# Patient Record
Sex: FEMALE | Race: BLACK OR AFRICAN AMERICAN | NOT HISPANIC OR LATINO | URBAN - METROPOLITAN AREA
[De-identification: names, ages, dates, MRNs, and addresses within clinical notes are randomized per-mention and may not be internally consistent; named-entity substitution may affect disease eponyms.]

---

## 2017-05-15 ENCOUNTER — EMERGENCY (EMERGENCY)
Facility: HOSPITAL | Age: 42
LOS: 1 days | Discharge: HOME | End: 2017-05-15
Attending: EMERGENCY MEDICINE | Admitting: EMERGENCY MEDICINE
Payer: COMMERCIAL

## 2017-05-15 VITALS
DIASTOLIC BLOOD PRESSURE: 79 MMHG | OXYGEN SATURATION: 100 % | RESPIRATION RATE: 16 BRPM | TEMPERATURE: 98 F | SYSTOLIC BLOOD PRESSURE: 116 MMHG | HEART RATE: 68 BPM

## 2017-05-15 VITALS
RESPIRATION RATE: 19 BRPM | HEART RATE: 67 BPM | DIASTOLIC BLOOD PRESSURE: 70 MMHG | SYSTOLIC BLOOD PRESSURE: 110 MMHG | OXYGEN SATURATION: 100 % | TEMPERATURE: 98 F

## 2017-05-15 DIAGNOSIS — O00.1 TUBAL PREGNANCY: Chronic | ICD-10-CM

## 2017-05-15 DIAGNOSIS — K37 UNSPECIFIED APPENDICITIS: Chronic | ICD-10-CM

## 2017-05-15 LAB
ALBUMIN SERPL ELPH-MCNC: 3.9 G/DL — SIGNIFICANT CHANGE UP (ref 3.3–5)
ALP SERPL-CCNC: 67 U/L — SIGNIFICANT CHANGE UP (ref 40–120)
ALT FLD-CCNC: 20 U/L — SIGNIFICANT CHANGE UP (ref 4–33)
AST SERPL-CCNC: 30 U/L — SIGNIFICANT CHANGE UP (ref 4–32)
BASOPHILS # BLD AUTO: 0.02 K/UL — SIGNIFICANT CHANGE UP (ref 0–0.2)
BASOPHILS NFR BLD AUTO: 0.2 % — SIGNIFICANT CHANGE UP (ref 0–2)
BILIRUB SERPL-MCNC: 0.2 MG/DL — SIGNIFICANT CHANGE UP (ref 0.2–1.2)
BUN SERPL-MCNC: 23 MG/DL — SIGNIFICANT CHANGE UP (ref 7–23)
CALCIUM SERPL-MCNC: 10 MG/DL — SIGNIFICANT CHANGE UP (ref 8.4–10.5)
CHLORIDE SERPL-SCNC: 97 MMOL/L — LOW (ref 98–107)
CK MB BLD-MCNC: 0.9 — SIGNIFICANT CHANGE UP (ref 0–2.5)
CK MB BLD-MCNC: 1.67 NG/ML — SIGNIFICANT CHANGE UP (ref 1–4.7)
CK SERPL-CCNC: 176 U/L — HIGH (ref 25–170)
CO2 SERPL-SCNC: 23 MMOL/L — SIGNIFICANT CHANGE UP (ref 22–31)
CREAT SERPL-MCNC: 1.01 MG/DL — SIGNIFICANT CHANGE UP (ref 0.5–1.3)
EOSINOPHIL # BLD AUTO: 0.2 K/UL — SIGNIFICANT CHANGE UP (ref 0–0.5)
EOSINOPHIL NFR BLD AUTO: 1.9 % — SIGNIFICANT CHANGE UP (ref 0–6)
GLUCOSE SERPL-MCNC: 101 MG/DL — HIGH (ref 70–99)
HCG SERPL-ACNC: < 5 MIU/ML — SIGNIFICANT CHANGE UP
HCT VFR BLD CALC: 35.5 % — SIGNIFICANT CHANGE UP (ref 34.5–45)
HGB BLD-MCNC: 11.9 G/DL — SIGNIFICANT CHANGE UP (ref 11.5–15.5)
IMM GRANULOCYTES NFR BLD AUTO: 0.2 % — SIGNIFICANT CHANGE UP (ref 0–1.5)
LYMPHOCYTES # BLD AUTO: 2.17 K/UL — SIGNIFICANT CHANGE UP (ref 1–3.3)
LYMPHOCYTES # BLD AUTO: 21.1 % — SIGNIFICANT CHANGE UP (ref 13–44)
MCHC RBC-ENTMCNC: 26.2 PG — LOW (ref 27–34)
MCHC RBC-ENTMCNC: 33.5 % — SIGNIFICANT CHANGE UP (ref 32–36)
MCV RBC AUTO: 78.2 FL — LOW (ref 80–100)
MONOCYTES # BLD AUTO: 0.56 K/UL — SIGNIFICANT CHANGE UP (ref 0–0.9)
MONOCYTES NFR BLD AUTO: 5.4 % — SIGNIFICANT CHANGE UP (ref 2–14)
NEUTROPHILS # BLD AUTO: 7.32 K/UL — SIGNIFICANT CHANGE UP (ref 1.8–7.4)
NEUTROPHILS NFR BLD AUTO: 71.2 % — SIGNIFICANT CHANGE UP (ref 43–77)
PLATELET # BLD AUTO: 274 K/UL — SIGNIFICANT CHANGE UP (ref 150–400)
PMV BLD: 9.4 FL — SIGNIFICANT CHANGE UP (ref 7–13)
POTASSIUM SERPL-MCNC: 4 MMOL/L — SIGNIFICANT CHANGE UP (ref 3.5–5.3)
POTASSIUM SERPL-SCNC: 4 MMOL/L — SIGNIFICANT CHANGE UP (ref 3.5–5.3)
PROT SERPL-MCNC: 8.1 G/DL — SIGNIFICANT CHANGE UP (ref 6–8.3)
RBC # BLD: 4.54 M/UL — SIGNIFICANT CHANGE UP (ref 3.8–5.2)
RBC # FLD: 14.7 % — HIGH (ref 10.3–14.5)
SODIUM SERPL-SCNC: 136 MMOL/L — SIGNIFICANT CHANGE UP (ref 135–145)
TROPONIN T SERPL-MCNC: < 0.06 NG/ML — SIGNIFICANT CHANGE UP (ref 0–0.06)
WBC # BLD: 10.29 K/UL — SIGNIFICANT CHANGE UP (ref 3.8–10.5)
WBC # FLD AUTO: 10.29 K/UL — SIGNIFICANT CHANGE UP (ref 3.8–10.5)

## 2017-05-15 PROCEDURE — 70450 CT HEAD/BRAIN W/O DYE: CPT | Mod: 26

## 2017-05-15 PROCEDURE — 99285 EMERGENCY DEPT VISIT HI MDM: CPT | Mod: 25

## 2017-05-15 PROCEDURE — 71010: CPT | Mod: 26

## 2017-05-15 PROCEDURE — 93010 ELECTROCARDIOGRAM REPORT: CPT

## 2017-05-15 RX ORDER — ASPIRIN/CALCIUM CARB/MAGNESIUM 324 MG
325 TABLET ORAL ONCE
Qty: 0 | Refills: 0 | Status: COMPLETED | OUTPATIENT
Start: 2017-05-15 | End: 2017-05-15

## 2017-05-15 RX ADMIN — Medication 325 MILLIGRAM(S): at 01:24

## 2017-05-15 NOTE — ED ADULT NURSE NOTE - FAMILY HISTORY
Father  Still living? No  Family history of coronary artery disease, Age at diagnosis: Age Unknown     Mother  Still living? Unknown  Family history of coronary artery disease, Age at diagnosis: Age Unknown

## 2017-05-15 NOTE — ED PROVIDER NOTE - PROGRESS NOTE DETAILS
Hector AARON- HEART score of 2, had neg stress test last year, pt advised to rest, stay hydrated and f/u with her pmd, return promptly in c/o worsening symptoms

## 2017-05-15 NOTE — ED ADULT TRIAGE NOTE - CHIEF COMPLAINT QUOTE
pt c/o chest discomfort and headache for 2 days. pt states she feels like she is going to faint. pt denies any nausea or vomiting and states no abdominal pain.

## 2017-05-15 NOTE — ED PROVIDER NOTE - OBJECTIVE STATEMENT
42 y/o F with h/o htn but not on meds for yrs now as it was stable p/w headache and malaise today and went to sleep and then woke up drenched in sweats with sob and chest tightness. Pt never had symptoms like this before, chest tightness was substernal and has resolved, pt is non smoker, no fhx of cad, no weakness, numbness, sick contact, trauma or recent travel

## 2017-05-15 NOTE — ED ADULT NURSE NOTE - OBJECTIVE STATEMENT
pt. came in c/o SOB and on and off chest discomfort for 1 day, feeling very weak and fainting. denies any LOC. pt. states she had intermittent h/a 2 days ago, denies any dizziness. pt. c/o nausea, no vomiting. denies any fever, dysuria, recent travel outside the country. labs were drawn. g20 saline lock on rt ac inserted with no ss of infiltration. respiration appears symmetrical and unlabored. awaits further eval.

## 2017-10-31 ENCOUNTER — EMERGENCY (EMERGENCY)
Facility: HOSPITAL | Age: 42
LOS: 1 days | Discharge: HOME | End: 2017-10-31
Attending: EMERGENCY MEDICINE | Admitting: EMERGENCY MEDICINE
Payer: COMMERCIAL

## 2017-10-31 VITALS
DIASTOLIC BLOOD PRESSURE: 86 MMHG | TEMPERATURE: 98 F | SYSTOLIC BLOOD PRESSURE: 129 MMHG | RESPIRATION RATE: 18 BRPM | HEART RATE: 67 BPM | OXYGEN SATURATION: 99 %

## 2017-10-31 VITALS
HEART RATE: 66 BPM | RESPIRATION RATE: 17 BRPM | DIASTOLIC BLOOD PRESSURE: 76 MMHG | SYSTOLIC BLOOD PRESSURE: 123 MMHG | OXYGEN SATURATION: 99 %

## 2017-10-31 DIAGNOSIS — O00.1 TUBAL PREGNANCY: Chronic | ICD-10-CM

## 2017-10-31 DIAGNOSIS — K37 UNSPECIFIED APPENDICITIS: Chronic | ICD-10-CM

## 2017-10-31 PROCEDURE — 99283 EMERGENCY DEPT VISIT LOW MDM: CPT

## 2017-10-31 RX ORDER — IBUPROFEN 200 MG
1 TABLET ORAL
Qty: 20 | Refills: 0 | OUTPATIENT
Start: 2017-10-31 | End: 2017-11-05

## 2017-10-31 RX ORDER — ACETAMINOPHEN 500 MG
650 TABLET ORAL ONCE
Qty: 0 | Refills: 0 | Status: DISCONTINUED | OUTPATIENT
Start: 2017-10-31 | End: 2017-11-11

## 2017-10-31 RX ORDER — DIAZEPAM 5 MG
1 TABLET ORAL
Qty: 9 | Refills: 0 | OUTPATIENT
Start: 2017-10-31 | End: 2017-11-03

## 2017-10-31 RX ORDER — IBUPROFEN 200 MG
600 TABLET ORAL ONCE
Qty: 0 | Refills: 0 | Status: COMPLETED | OUTPATIENT
Start: 2017-10-31 | End: 2017-10-31

## 2017-10-31 RX ADMIN — Medication 600 MILLIGRAM(S): at 11:34

## 2017-10-31 RX ADMIN — Medication 600 MILLIGRAM(S): at 12:23

## 2017-10-31 NOTE — ED ADULT TRIAGE NOTE - CHIEF COMPLAINT QUOTE
pt coming with sebastien. lower back pain since yest. pain rad. down the legs, denies truama, pt took Motrin last night w/o relief.

## 2017-10-31 NOTE — ED PROVIDER NOTE - OBJECTIVE STATEMENT
41 y/o F w/ PMHx of HTN (somewhat non compliant w/ meds), presents to the ED c/o lower back pain x2 days. Pt states she woke up w/ gradual onset of right lower back pain, worse w/ movement. Pain is lower back and radiates to posterior right thigh. Denies urinary/bowel dysfunction, fever, chills, abd pain, weakness or paresthesias. Pt denies any prior similar episodes. Pt does feel somewhat improved w/ Ibuprofen.

## 2017-10-31 NOTE — ED PROVIDER NOTE - CHPI ED SYMPTOMS NEG
no numbness/no fever, no chills, no weakness/no bladder dysfunction/no bowel dysfunction/no tingling

## 2017-10-31 NOTE — ED PROVIDER NOTE - MUSCULOSKELETAL MINIMAL EXAM
paralumbar and right SI point tenderness, no midline spinal tenderness, motor and sensory intact, pulses 2+ intact, positive straight leg raise

## 2017-10-31 NOTE — ED PROVIDER NOTE - CARE PLAN
Principal Discharge DX:	Sciatica  Instructions for follow-up, activity and diet:	Limit further injury, over exertion, and rest affected area. Take motrin 600mg every 6hours as needed for mild to moderate pain. Take Valium 5mg one tab every 6-8 hours as needed for severe pain. NEVER COMBINE WITH ALCOHOL. NEVER DRINK OR DRIVE ON VALIUM IT WILL CAUSE ACCIDENTS. Please continue all home medications as directed. See your regular doctor within 72 hours for follow-up care. Consider seeing a spine doctor, see attached list. Return to ER for new or worsening symptoms.

## 2017-10-31 NOTE — ED PROVIDER NOTE - MEDICAL DECISION MAKING DETAILS
A/P 43 y/o F w/ back pain w/ radiation, consistent w/ MSK pain/sciatica. Neuro nonfocal. Advise Pain management and follow up w/ PMD.

## 2017-10-31 NOTE — ED PROVIDER NOTE - PROGRESS NOTE DETAILS
Cameron PGY-3: pt ambulatory, feeling significantly better, will dc with pain meds and ortho/spine list.

## 2018-04-23 ENCOUNTER — EMERGENCY (EMERGENCY)
Facility: HOSPITAL | Age: 43
LOS: 1 days | Discharge: LEFT BEFORE DISCHARGE | End: 2018-04-23
Admitting: EMERGENCY MEDICINE

## 2018-04-23 VITALS
DIASTOLIC BLOOD PRESSURE: 79 MMHG | RESPIRATION RATE: 16 BRPM | HEART RATE: 70 BPM | SYSTOLIC BLOOD PRESSURE: 137 MMHG | OXYGEN SATURATION: 100 % | TEMPERATURE: 98 F

## 2018-04-23 DIAGNOSIS — K37 UNSPECIFIED APPENDICITIS: Chronic | ICD-10-CM

## 2018-04-23 DIAGNOSIS — O00.1 TUBAL PREGNANCY: Chronic | ICD-10-CM

## 2018-04-23 NOTE — ED ADULT TRIAGE NOTE - CHIEF COMPLAINT QUOTE
c.o left sided chest pain starting 15 minutes ago. took 162 aspirin with no relief. denies nausea/vomiting or sob. pmh htn

## 2018-05-30 ENCOUNTER — RESULT REVIEW (OUTPATIENT)
Age: 43
End: 2018-05-30

## 2018-06-15 NOTE — ED ADULT NURSE NOTE - EXTENSIONS OF SELF_ADULT
----- Message from Zhanna Fletcher [facility]. Please call back at [phone].  
Left mess rx approved  
None

## 2018-10-03 NOTE — ED PROVIDER NOTE - EKG #1 DATE/TIME
Syncope/Near Syncope/Dizziness


Time Seen by Provider: 10/03/18 10:41


Chief Complaint (Nursing): Dizziness/Lightheaded


Chief Complaint (Provider): Dizziness/Lightheaded


History Per: Patient, Family (grand daughter),  (Selam, patient's 

grand daughter)


History/Exam Limitations: no limitations


Additional Complaint(s): 





Patient is a 65 y/o female with history of hypertension and 

hypercholesterolemia, who presents to the ED complaining of blurry vision with 

associated dizziness and nausea. Patient reports that the dizziness and vision 

change episodes are intermittent and occur for 30 minute intervals. She also 

states that she feels like the room is spinning and she feels a little 

unbalanced and feels like she is about to faint. Patient also reports current 

photosensitivity and still has some blurry vision on arrival. She denies chest 

pain, shortness of breath, fever, and vomiting. Additionally, patient is 

concerned about some bruising on her arms.





Patient's grand daughter, Selam, who is at her bedside has been authorized by 

patient to function as a .





PMD: Dr. Kylee Rivero. At [facility]





Past Medical History


Reviewed: Historical Data, Nursing Documentation, Vital Signs


Vital Signs: 





                                Last Vital Signs











Temp  97 F L  10/03/18 09:51


 


Pulse  64   10/03/18 09:51


 


Resp  20   10/03/18 09:51


 


BP  145/93 H  10/03/18 09:51


 


Pulse Ox  97   10/03/18 09:51














- Medical History


PMH: Anxiety, Colonic Polyps, Depression, Gastritis, HTN, Hypercholesterolemia, 

Osteoporosis


   Denies: Malignancy, Chronic Kidney Disease





- Surgical History


Surgical History: Endoscopy, 





- Family History


Family History: States: Unknown Family Hx





- Immunization History


Hx Tetanus Toxoid Vaccination: No


Hx Influenza Vaccination: Yes


Hx Pneumococcal Vaccination: No





- Home Medications


Home Medications: 


                                Ambulatory Orders











 Medication  Instructions  Recorded


 


Amlodipine Besylate [Amlodipine] 5 mg PO DAILY 14


 


Atorvastatin Calcium [Lipitor] 10 mg PO DAILY 14


 


Raloxifene HCl [Evista] 60 mg PO DAILY 02/15/16


 


Ibuprofen [Motrin] 600 mg PO TID #20 tab 16


 


Methocarbamol [Robaxin] 500 mg PO BID #8 tab 16


 


Naproxen 500 mg PO BID #30 tab 11/16/17


 


Dicyclomine [Bentyl] 20 mg PO QID PRN #20 tab 18


 


Saccharomyces Boulardi [Florastor] 500 mg PO BID #28 cap 18


 


Meclizine [Meclizine*] 25 mg PO Q6 PRN #6 tab 10/03/18














- Allergies


Allergies/Adverse Reactions: 


                                    Allergies











Allergy/AdvReac Type Severity Reaction Status Date / Time


 


No Known Allergies Allergy   Verified 18 10:27














Review of Systems


ROS Statement: Except As Marked, All Systems Reviewed And Found Negative


Constitutional: Negative for: Fever


Eyes: Positive for: Vision Change (blurry vision)


Cardiovascular: Negative for: Chest Pain


Respiratory: Negative for: Shortness of Breath


Gastrointestinal: Positive for: Nausea.  Negative for: Vomiting


Skin: Positive for: Bruising


Neurological: Negative for: Dizziness





Physical Exam





- Reviewed


Nursing Documentation Reviewed: Yes


Vital Signs Reviewed: Yes





- Physical Exam


Appears: Positive for: Well, Non-toxic, No Acute Distress


Head Exam: Positive for: ATRAUMATIC, NORMAL INSPECTION, NORMOCEPHALIC


Skin: Positive for: Normal Color, Warm, DRY


Eye Exam: Positive for: EOMI, Normal appearance, PERRL


Neck: Positive for: Normal, Painless ROM


Cardiovascular/Chest: Positive for: Regular Rate, Rhythm.  Negative for: Murmur


Respiratory: Positive for: Normal Breath Sounds.  Negative for: Respiratory 

Distress


Gastrointestinal/Abdominal: Positive for: Normal Exam, Soft.  Negative for: 

Tenderness


Back: Positive for: Normal Inspection.  Negative for: L CVA Tenderness, R CVA 

Tenderness


Extremity: Positive for: Normal ROM.  Negative for: Pedal Edema, Deformity


Neurologic/Psych: Positive for: Alert, CNs II-XII (intact), Oriented, 

Mood/Affect (flat), Cerebellar Tests (intact), Gait (steady).  Negative for: 

Motor/Sensory Deficits, Aphasia, Facial Droop





- Laboratory Results


Result Diagrams: 


                                 10/03/18 11:02





                                 10/03/18 11:02





- ECG


O2 Sat by Pulse Oximetry: 97 (RA)


Pulse Ox Interpretation: Normal





Medical Decision Making


Medical Decision Making: 





Time: 10:50


Initial Impression: Dizziness, r/o cardiac etiology, vertigo, cranial 

abnormality


Initial Plan: 


--CT head


--CMP


--Troponin


--CBC w/ diff


--Antivert 25 mg








Time: 11:28





FINDINGS:





HEMORRHAGE:


No intracranial hemorrhage. 





BRAIN:


No mass effect or edema.  There is mild generalized cerebral atrophy and mild 

prominence of the frontal extra axial spaces and cerebral sulci.  No 

particularly prominent microvascular ischemic changes are noted..





VENTRICLES:


Unremarkable. No hydrocephalus. 





CALVARIUM:


Unremarkable.





PARANASAL SINUSES:


Unremarkable as visualized. No significant inflammatory changes.





MASTOID AIR CELLS:


Unremarkable as visualized. No inflammatory changes.





OTHER FINDINGS:


None.





IMPRESSION:


Mild generalized cerebral atrophy.  Otherwise unremarkable exam





No hemorrhage or mass effect.








13:55


Patient was reevaluated and does not have any blurred vision and will most 

likely be discharged. Told to follow up with Dr. Rivero. 


------------------------------------------------------------------------------

-------------------


Scribe Attestation:


Documented by Zana Molina acting as a scribe for Neil Perez MD





Provider Scribe Attestation:


All medical record entries made by the Scribe were at my direction and 

personally dictated by me. I have reviewed the chart and agree that the record 

accurately reflects my personal performance of the history, physical exam, 

medical decision making, and the department course for this patient. I have also

 personally directed, reviewed, and agree with the discharge instructions and 

disposition.





Disposition





- Clinical Impression


Clinical Impression: 


 Dizziness








- Disposition


Condition: IMPROVED


Additional Instructions: 


follow up with Dr Rivero in 1-2 days for reevaluation- possible referral to 

neurologist


return to the ED with any worsening or concerning symptoms


Prescriptions: 


Meclizine [Meclizine*] 25 mg PO Q6 PRN #6 tab


 PRN Reason: Dizziness


Instructions:  Vertigo (a Type of Dizziness)


Forms:  [place] (English)


Print Language: Kittitian 15-May-2017 01:00

## 2019-01-19 ENCOUNTER — EMERGENCY (EMERGENCY)
Facility: HOSPITAL | Age: 44
LOS: 1 days | Discharge: HOME | End: 2019-01-19
Attending: EMERGENCY MEDICINE | Admitting: EMERGENCY MEDICINE
Payer: COMMERCIAL

## 2019-01-19 VITALS
HEART RATE: 81 BPM | DIASTOLIC BLOOD PRESSURE: 83 MMHG | OXYGEN SATURATION: 98 % | SYSTOLIC BLOOD PRESSURE: 144 MMHG | TEMPERATURE: 98 F | RESPIRATION RATE: 18 BRPM

## 2019-01-19 DIAGNOSIS — K37 UNSPECIFIED APPENDICITIS: Chronic | ICD-10-CM

## 2019-01-19 DIAGNOSIS — O00.1 TUBAL PREGNANCY: Chronic | ICD-10-CM

## 2019-01-19 PROBLEM — I10 ESSENTIAL (PRIMARY) HYPERTENSION: Chronic | Status: ACTIVE | Noted: 2017-10-31

## 2019-01-19 LAB
ALBUMIN SERPL ELPH-MCNC: 4.2 G/DL — SIGNIFICANT CHANGE UP (ref 3.3–5)
ALP SERPL-CCNC: 74 U/L — SIGNIFICANT CHANGE UP (ref 40–120)
ALT FLD-CCNC: 17 U/L — SIGNIFICANT CHANGE UP (ref 4–33)
ANION GAP SERPL CALC-SCNC: 12 MMO/L — SIGNIFICANT CHANGE UP (ref 7–14)
AST SERPL-CCNC: 33 U/L — HIGH (ref 4–32)
BASOPHILS # BLD AUTO: 0.03 K/UL — SIGNIFICANT CHANGE UP (ref 0–0.2)
BASOPHILS NFR BLD AUTO: 0.5 % — SIGNIFICANT CHANGE UP (ref 0–2)
BILIRUB SERPL-MCNC: 0.2 MG/DL — SIGNIFICANT CHANGE UP (ref 0.2–1.2)
BUN SERPL-MCNC: 10 MG/DL — SIGNIFICANT CHANGE UP (ref 7–23)
CALCIUM SERPL-MCNC: 9.2 MG/DL — SIGNIFICANT CHANGE UP (ref 8.4–10.5)
CHLORIDE SERPL-SCNC: 102 MMOL/L — SIGNIFICANT CHANGE UP (ref 98–107)
CO2 SERPL-SCNC: 24 MMOL/L — SIGNIFICANT CHANGE UP (ref 22–31)
CREAT SERPL-MCNC: 0.72 MG/DL — SIGNIFICANT CHANGE UP (ref 0.5–1.3)
EOSINOPHIL # BLD AUTO: 0.4 K/UL — SIGNIFICANT CHANGE UP (ref 0–0.5)
EOSINOPHIL NFR BLD AUTO: 7 % — HIGH (ref 0–6)
GLUCOSE SERPL-MCNC: 110 MG/DL — HIGH (ref 70–99)
HCT VFR BLD CALC: 31.2 % — LOW (ref 34.5–45)
HGB BLD-MCNC: 9.9 G/DL — LOW (ref 11.5–15.5)
IMM GRANULOCYTES NFR BLD AUTO: 0.2 % — SIGNIFICANT CHANGE UP (ref 0–1.5)
LYMPHOCYTES # BLD AUTO: 1.74 K/UL — SIGNIFICANT CHANGE UP (ref 1–3.3)
LYMPHOCYTES # BLD AUTO: 30.3 % — SIGNIFICANT CHANGE UP (ref 13–44)
MCHC RBC-ENTMCNC: 22.5 PG — LOW (ref 27–34)
MCHC RBC-ENTMCNC: 31.7 % — LOW (ref 32–36)
MCV RBC AUTO: 70.9 FL — LOW (ref 80–100)
MONOCYTES # BLD AUTO: 0.59 K/UL — SIGNIFICANT CHANGE UP (ref 0–0.9)
MONOCYTES NFR BLD AUTO: 10.3 % — SIGNIFICANT CHANGE UP (ref 2–14)
NEUTROPHILS # BLD AUTO: 2.97 K/UL — SIGNIFICANT CHANGE UP (ref 1.8–7.4)
NEUTROPHILS NFR BLD AUTO: 51.7 % — SIGNIFICANT CHANGE UP (ref 43–77)
NRBC # FLD: 0 K/UL — LOW (ref 25–125)
PLATELET # BLD AUTO: 297 K/UL — SIGNIFICANT CHANGE UP (ref 150–400)
PMV BLD: 9.1 FL — SIGNIFICANT CHANGE UP (ref 7–13)
POTASSIUM SERPL-MCNC: 4.2 MMOL/L — SIGNIFICANT CHANGE UP (ref 3.5–5.3)
POTASSIUM SERPL-SCNC: 4.2 MMOL/L — SIGNIFICANT CHANGE UP (ref 3.5–5.3)
PROT SERPL-MCNC: 7.6 G/DL — SIGNIFICANT CHANGE UP (ref 6–8.3)
RBC # BLD: 4.4 M/UL — SIGNIFICANT CHANGE UP (ref 3.8–5.2)
RBC # FLD: 17.8 % — HIGH (ref 10.3–14.5)
SODIUM SERPL-SCNC: 138 MMOL/L — SIGNIFICANT CHANGE UP (ref 135–145)
WBC # BLD: 5.74 K/UL — SIGNIFICANT CHANGE UP (ref 3.8–10.5)
WBC # FLD AUTO: 5.74 K/UL — SIGNIFICANT CHANGE UP (ref 3.8–10.5)

## 2019-01-19 PROCEDURE — 71046 X-RAY EXAM CHEST 2 VIEWS: CPT | Mod: 26

## 2019-01-19 PROCEDURE — 99284 EMERGENCY DEPT VISIT MOD MDM: CPT | Mod: 25

## 2019-01-19 RX ORDER — SODIUM CHLORIDE 9 MG/ML
1000 INJECTION INTRAMUSCULAR; INTRAVENOUS; SUBCUTANEOUS ONCE
Qty: 0 | Refills: 0 | Status: COMPLETED | OUTPATIENT
Start: 2019-01-19 | End: 2019-01-19

## 2019-01-19 RX ORDER — ACETAMINOPHEN 500 MG
650 TABLET ORAL ONCE
Qty: 0 | Refills: 0 | Status: COMPLETED | OUTPATIENT
Start: 2019-01-19 | End: 2019-01-19

## 2019-01-19 RX ADMIN — SODIUM CHLORIDE 1000 MILLILITER(S): 9 INJECTION INTRAMUSCULAR; INTRAVENOUS; SUBCUTANEOUS at 08:31

## 2019-01-19 RX ADMIN — Medication 650 MILLIGRAM(S): at 08:30

## 2019-01-19 NOTE — ED ADULT TRIAGE NOTE - CHIEF COMPLAINT QUOTE
Pt walk in c/o chest tightness for 2 days, constant, non radiating with productive cough, yellow sputum, SOB, Body aches Throat pain, Denies Palpitation N V Fever Denies PMHx

## 2019-01-19 NOTE — ED PROVIDER NOTE - PHYSICAL EXAMINATION
Gen: AAOx3, non-toxic  Head: NCAT  HEENT: EOMI, normal conjunctiva, oral mucosa moist, -Erythema or exudates  Lung: CTAB, no respiratory distress, no wheezes/rhonchi/rales B/L, speaking in full sentences  CV: RRR, no murmurs, rubs or gallops  Abd: soft, NTND, no guarding, no CVA tenderness  MSK: no visible deformities  Psych: normal affect.   ~Fortino Freedman M.D. Resident

## 2019-01-19 NOTE — ED PROVIDER NOTE - NS ED ROS FT
GENERAL: No fever or chills, EYES: no change in vision, HEENT: no trouble swallowing or speaking, CARDIAC: +chest tightness, PULMONARY: +cough or SOB,   GI: no abdominal pain, no nausea, no vomiting, no diarrhea or constipation, : No changes in urination, SKIN: no rashes, NEURO: no headache,  MSK: No joint pain ~Fortino Freedman M.D. Resident

## 2019-01-19 NOTE — ED PROVIDER NOTE - MEDICAL DECISION MAKING DETAILS
43yF with h/o HTN presents with intermittent productive cough (yellow/white), subjective fevers, chills of 6 duration with concern for PNA vs URI  Plan: Labs,CXR, poct HCG 43yF with h/o HTN presents with intermittent productive cough (yellow/white), subjective fevers, chills of 6 duration with concern for PNA vs URI  Plan: Labs,CXR, poct HCG    cabot: 43F with PMH of HTN, here with 6d of fevers, chills, body aches, cough with white sputum.  She has been taking her daughter's amox.  No recent travel or sick contacts.  On exam, HDS, NAD, MMM, eyes clear, lungs CTAB, heart sounds normal, abd soft, NT, ND, no CVAT, LEs without edema, wwp, skin normal temperature and color, neuro: alert and oriented, no focal deficits.  Likely viral URI.  Will check CXR, hydrate, tx sx.

## 2019-01-19 NOTE — ED PROVIDER NOTE - OBJECTIVE STATEMENT
43yF with h/o HTN presents with intermittent productive cough (yellow/white), subjective fevers, chills of 6 duration. Patient states that she took azithromycin (500mg x 3days) which was originally prescribed for her daughter without any relief. Patient endorse chest tightness, lightheadedness, and occasional sob 2/2 to cough but denies changes in vision, n/v, abd pain or rash.

## 2019-01-19 NOTE — ED PROVIDER NOTE - ATTENDING CONTRIBUTION TO CARE
I, Jennifer Cabot, MD, have performed a history and physical exam of the patient and discussed their management with the resident. I reviewed the resident's note and agree with the documented findings and plan of care. My medical decision making and observations are found above.    veronicagwen: 43F with PMH of HTN, here with 6d of fevers, chills, body aches, cough with white sputum.  She has been taking her daughter's amox.  No recent travel or sick contacts.  On exam, HDS, NAD, MMM, eyes clear, lungs CTAB, heart sounds normal, abd soft, NT, ND, no CVAT, LEs without edema, wwp, skin normal temperature and color, neuro: alert and oriented, no focal deficits.  Likely viral URI.  Will check CXR, hydrate, tx sx.

## 2019-01-19 NOTE — ED PROVIDER NOTE - NSFOLLOWUPINSTRUCTIONS_ED_ALL_ED_FT
Please follow up with your primary care physician in 24-48 hours  You have been prescribed Tessalon Perles  Please take as prescribed   Please return if any of the following: Fever, chills, body aches, nausea, vomiting, chest pain, shortness of breath or any major concern

## 2019-01-19 NOTE — ED ADULT NURSE NOTE - OBJECTIVE STATEMENT
Pt A+OX3 c/o chest tightness, intermittent SOB, cough, and lightheadedness x3 days.  Says she took her daughters antibiotic with no relief.  Labs obtained and sent as ordered.  #20g SL right hand placed.  IVF begun.  Tylenol given as ordered

## 2019-05-17 ENCOUNTER — NON-APPOINTMENT (OUTPATIENT)
Age: 44
End: 2019-05-17

## 2019-05-17 ENCOUNTER — APPOINTMENT (OUTPATIENT)
Dept: CARDIOLOGY | Facility: CLINIC | Age: 44
End: 2019-05-17
Payer: COMMERCIAL

## 2019-05-17 VITALS
DIASTOLIC BLOOD PRESSURE: 87 MMHG | HEART RATE: 57 BPM | OXYGEN SATURATION: 100 % | WEIGHT: 213 LBS | HEIGHT: 64 IN | BODY MASS INDEX: 36.37 KG/M2 | SYSTOLIC BLOOD PRESSURE: 131 MMHG

## 2019-05-17 DIAGNOSIS — R00.2 PALPITATIONS: ICD-10-CM

## 2019-05-17 DIAGNOSIS — Z82.49 FAMILY HISTORY OF ISCHEMIC HEART DISEASE AND OTHER DISEASES OF THE CIRCULATORY SYSTEM: ICD-10-CM

## 2019-05-17 DIAGNOSIS — Z86.79 PERSONAL HISTORY OF OTHER DISEASES OF THE CIRCULATORY SYSTEM: ICD-10-CM

## 2019-05-17 DIAGNOSIS — R73.03 PREDIABETES.: ICD-10-CM

## 2019-05-17 DIAGNOSIS — Z87.59 PERSONAL HISTORY OF OTHER COMPLICATIONS OF PREGNANCY, CHILDBIRTH AND THE PUERPERIUM: ICD-10-CM

## 2019-05-17 PROCEDURE — 99204 OFFICE O/P NEW MOD 45 MIN: CPT

## 2019-05-17 PROCEDURE — 93000 ELECTROCARDIOGRAM COMPLETE: CPT

## 2019-05-21 PROBLEM — Z86.79 HISTORY OF HYPERTENSION: Status: RESOLVED | Noted: 2019-05-21 | Resolved: 2019-05-21

## 2019-05-21 PROBLEM — Z82.49 FAMILY HISTORY OF ACUTE MYOCARDIAL INFARCTION: Status: ACTIVE | Noted: 2019-05-21

## 2019-05-21 PROBLEM — Z87.59 HISTORY OF ECTOPIC PREGNANCY: Status: RESOLVED | Noted: 2019-05-21 | Resolved: 2019-05-21

## 2019-05-21 PROBLEM — R00.2 PALPITATIONS: Status: ACTIVE | Noted: 2019-05-21

## 2019-05-21 PROBLEM — Z82.49 FAMILY HISTORY OF CARDIOMYOPATHY: Status: ACTIVE | Noted: 2019-05-21

## 2019-05-21 PROBLEM — R73.03 BORDERLINE DIABETES: Status: ACTIVE | Noted: 2019-05-21

## 2019-05-21 PROBLEM — R73.03 BORDERLINE DIABETES MELLITUS: Status: RESOLVED | Noted: 2019-05-21 | Resolved: 2019-05-21

## 2019-05-21 NOTE — HISTORY OF PRESENT ILLNESS
[FreeTextEntry1] : Ms. Doss presents to the office today for an initial cardiovascular evaluation.\par \par Ms. Doss is a 43 year old female with a medical history significant for hypertension and borderline diabetes mellitus.  In addition, she has previously undergone three  sections, an appendectomy, and surgery to evacuate an ectopic pregnancy.\par \par Ms. Doss reports a history of worsening dyspnea over the last year or so.  She finds that she becomes short of breath with activities such as walking more than two blocks or climbing a single flight of stairs.  She has not had any dyspnea at rest.  Ms. Doss denies having any chest pain either at rest or with exertion.  Ms. Doss reports that she ordinarily experiences relatively frequent palpitations but denies having any presyncope or syncope.  In addition, there has been no history of orthopnea, paroxysmal nocturnal dyspnea, or edema.

## 2019-05-21 NOTE — DISCUSSION/SUMMARY
[FreeTextEntry1] : In summary, Ms. Doss is a 43 year old female with a medical history significant for hypertension and borderline diabetes mellitus.  In addition, she has previously undergone three  sections, an appendectomy, and surgery to evacuate an ectopic pregnancy.  Ms. Doss now presents with a history of exertional dyspnea that has been progressive over the past year or so.  I suggested to her that we proceed with an exercise stress echocardiogram for further evaluation.  Ms. Doss is in agreement with this plan and will schedule the stress echocardiogram.  She will follow up with me once I obtain the results of the study.

## 2019-06-04 ENCOUNTER — APPOINTMENT (OUTPATIENT)
Dept: CV DIAGNOSITCS | Facility: HOSPITAL | Age: 44
End: 2019-06-04

## 2019-08-06 ENCOUNTER — APPOINTMENT (OUTPATIENT)
Dept: CV DIAGNOSITCS | Facility: HOSPITAL | Age: 44
End: 2019-08-06
Payer: COMMERCIAL

## 2019-08-06 ENCOUNTER — OUTPATIENT (OUTPATIENT)
Dept: OUTPATIENT SERVICES | Facility: HOSPITAL | Age: 44
LOS: 1 days | End: 2019-08-06

## 2019-08-06 DIAGNOSIS — K37 UNSPECIFIED APPENDICITIS: Chronic | ICD-10-CM

## 2019-08-06 DIAGNOSIS — O00.1 TUBAL PREGNANCY: Chronic | ICD-10-CM

## 2019-08-06 DIAGNOSIS — R06.00 DYSPNEA, UNSPECIFIED: ICD-10-CM

## 2019-08-06 LAB — HCG UR QL: NEGATIVE — SIGNIFICANT CHANGE UP

## 2019-08-06 PROCEDURE — 93325 DOPPLER ECHO COLOR FLOW MAPG: CPT | Mod: 26,GC

## 2019-08-06 PROCEDURE — 93320 DOPPLER ECHO COMPLETE: CPT | Mod: 26,GC

## 2019-08-06 PROCEDURE — 93350 STRESS TTE ONLY: CPT | Mod: 26

## 2019-09-01 ENCOUNTER — EMERGENCY (EMERGENCY)
Facility: HOSPITAL | Age: 44
LOS: 1 days | Discharge: HOME | End: 2019-09-01
Attending: EMERGENCY MEDICINE | Admitting: EMERGENCY MEDICINE
Payer: COMMERCIAL

## 2019-09-01 VITALS
DIASTOLIC BLOOD PRESSURE: 77 MMHG | HEART RATE: 70 BPM | TEMPERATURE: 98 F | RESPIRATION RATE: 18 BRPM | SYSTOLIC BLOOD PRESSURE: 131 MMHG | OXYGEN SATURATION: 100 %

## 2019-09-01 VITALS
RESPIRATION RATE: 18 BRPM | OXYGEN SATURATION: 96 % | HEART RATE: 69 BPM | DIASTOLIC BLOOD PRESSURE: 56 MMHG | SYSTOLIC BLOOD PRESSURE: 126 MMHG | TEMPERATURE: 98 F

## 2019-09-01 DIAGNOSIS — O00.1 TUBAL PREGNANCY: Chronic | ICD-10-CM

## 2019-09-01 DIAGNOSIS — K37 UNSPECIFIED APPENDICITIS: Chronic | ICD-10-CM

## 2019-09-01 LAB
ALBUMIN SERPL ELPH-MCNC: 4.1 G/DL — SIGNIFICANT CHANGE UP (ref 3.3–5)
ALP SERPL-CCNC: 76 U/L — SIGNIFICANT CHANGE UP (ref 40–120)
ALT FLD-CCNC: 12 U/L — SIGNIFICANT CHANGE UP (ref 4–33)
ANION GAP SERPL CALC-SCNC: 9 MMO/L — SIGNIFICANT CHANGE UP (ref 7–14)
ANISOCYTOSIS BLD QL: SIGNIFICANT CHANGE UP
APPEARANCE UR: SIGNIFICANT CHANGE UP
AST SERPL-CCNC: 16 U/L — SIGNIFICANT CHANGE UP (ref 4–32)
BACTERIA # UR AUTO: HIGH
BASOPHILS # BLD AUTO: 0.03 K/UL — SIGNIFICANT CHANGE UP (ref 0–0.2)
BASOPHILS NFR BLD AUTO: 0.3 % — SIGNIFICANT CHANGE UP (ref 0–2)
BASOPHILS NFR SPEC: 0 % — SIGNIFICANT CHANGE UP (ref 0–2)
BILIRUB SERPL-MCNC: 0.3 MG/DL — SIGNIFICANT CHANGE UP (ref 0.2–1.2)
BILIRUB UR-MCNC: NEGATIVE — SIGNIFICANT CHANGE UP
BLASTS # FLD: 0 % — SIGNIFICANT CHANGE UP (ref 0–0)
BLOOD UR QL VISUAL: HIGH
BUN SERPL-MCNC: 8 MG/DL — SIGNIFICANT CHANGE UP (ref 7–23)
CALCIUM SERPL-MCNC: 9.4 MG/DL — SIGNIFICANT CHANGE UP (ref 8.4–10.5)
CHLORIDE SERPL-SCNC: 103 MMOL/L — SIGNIFICANT CHANGE UP (ref 98–107)
CO2 SERPL-SCNC: 27 MMOL/L — SIGNIFICANT CHANGE UP (ref 22–31)
COLOR SPEC: SIGNIFICANT CHANGE UP
CREAT SERPL-MCNC: 0.72 MG/DL — SIGNIFICANT CHANGE UP (ref 0.5–1.3)
EOSINOPHIL # BLD AUTO: 0.02 K/UL — SIGNIFICANT CHANGE UP (ref 0–0.5)
EOSINOPHIL NFR BLD AUTO: 0.2 % — SIGNIFICANT CHANGE UP (ref 0–6)
EOSINOPHIL NFR FLD: 0.9 % — SIGNIFICANT CHANGE UP (ref 0–6)
GLUCOSE SERPL-MCNC: 96 MG/DL — SIGNIFICANT CHANGE UP (ref 70–99)
GLUCOSE UR-MCNC: NEGATIVE — SIGNIFICANT CHANGE UP
HCT VFR BLD CALC: 27.8 % — LOW (ref 34.5–45)
HGB BLD-MCNC: 8.5 G/DL — LOW (ref 11.5–15.5)
HYALINE CASTS # UR AUTO: SIGNIFICANT CHANGE UP
IMM GRANULOCYTES NFR BLD AUTO: 0.4 % — SIGNIFICANT CHANGE UP (ref 0–1.5)
KETONES UR-MCNC: NEGATIVE — SIGNIFICANT CHANGE UP
LEUKOCYTE ESTERASE UR-ACNC: SIGNIFICANT CHANGE UP
LYMPHOCYTES # BLD AUTO: 1.52 K/UL — SIGNIFICANT CHANGE UP (ref 1–3.3)
LYMPHOCYTES # BLD AUTO: 13.3 % — SIGNIFICANT CHANGE UP (ref 13–44)
LYMPHOCYTES NFR SPEC AUTO: 9.6 % — LOW (ref 13–44)
MCHC RBC-ENTMCNC: 20.1 PG — LOW (ref 27–34)
MCHC RBC-ENTMCNC: 30.6 % — LOW (ref 32–36)
MCV RBC AUTO: 65.7 FL — LOW (ref 80–100)
METAMYELOCYTES # FLD: 0 % — SIGNIFICANT CHANGE UP (ref 0–1)
MICROCYTES BLD QL: SIGNIFICANT CHANGE UP
MONOCYTES # BLD AUTO: 0.64 K/UL — SIGNIFICANT CHANGE UP (ref 0–0.9)
MONOCYTES NFR BLD AUTO: 5.6 % — SIGNIFICANT CHANGE UP (ref 2–14)
MONOCYTES NFR BLD: 2.6 % — SIGNIFICANT CHANGE UP (ref 2–9)
MYELOCYTES NFR BLD: 0 % — SIGNIFICANT CHANGE UP (ref 0–0)
NEUTROPHIL AB SER-ACNC: 85.2 % — HIGH (ref 43–77)
NEUTROPHILS # BLD AUTO: 9.16 K/UL — HIGH (ref 1.8–7.4)
NEUTROPHILS NFR BLD AUTO: 80.2 % — HIGH (ref 43–77)
NEUTS BAND # BLD: 0 % — SIGNIFICANT CHANGE UP (ref 0–6)
NITRITE UR-MCNC: NEGATIVE — SIGNIFICANT CHANGE UP
NRBC # FLD: 0 K/UL — SIGNIFICANT CHANGE UP (ref 0–0)
OTHER - HEMATOLOGY %: 0 — SIGNIFICANT CHANGE UP
PH UR: 7.5 — SIGNIFICANT CHANGE UP (ref 5–8)
PLATELET # BLD AUTO: 262 K/UL — SIGNIFICANT CHANGE UP (ref 150–400)
PLATELET COUNT - ESTIMATE: NORMAL — SIGNIFICANT CHANGE UP
PMV BLD: 8.6 FL — SIGNIFICANT CHANGE UP (ref 7–13)
POTASSIUM SERPL-MCNC: 3.7 MMOL/L — SIGNIFICANT CHANGE UP (ref 3.5–5.3)
POTASSIUM SERPL-SCNC: 3.7 MMOL/L — SIGNIFICANT CHANGE UP (ref 3.5–5.3)
PROMYELOCYTES # FLD: 0 % — SIGNIFICANT CHANGE UP (ref 0–0)
PROT SERPL-MCNC: 8 G/DL — SIGNIFICANT CHANGE UP (ref 6–8.3)
PROT UR-MCNC: 70 — SIGNIFICANT CHANGE UP
RBC # BLD: 4.23 M/UL — SIGNIFICANT CHANGE UP (ref 3.8–5.2)
RBC # FLD: 19.3 % — HIGH (ref 10.3–14.5)
RBC CASTS # UR COMP ASSIST: HIGH (ref 0–?)
SODIUM SERPL-SCNC: 139 MMOL/L — SIGNIFICANT CHANGE UP (ref 135–145)
SP GR SPEC: 1.01 — SIGNIFICANT CHANGE UP (ref 1–1.04)
SQUAMOUS # UR AUTO: SIGNIFICANT CHANGE UP
UROBILINOGEN FLD QL: NORMAL — SIGNIFICANT CHANGE UP
VARIANT LYMPHS # BLD: 1.7 % — SIGNIFICANT CHANGE UP
WBC # BLD: 11.42 K/UL — HIGH (ref 3.8–10.5)
WBC # FLD AUTO: 11.42 K/UL — HIGH (ref 3.8–10.5)
WBC UR QL: >50 — HIGH (ref 0–?)

## 2019-09-01 PROCEDURE — 74176 CT ABD & PELVIS W/O CONTRAST: CPT | Mod: 26

## 2019-09-01 PROCEDURE — 99284 EMERGENCY DEPT VISIT MOD MDM: CPT

## 2019-09-01 RX ORDER — ACETAMINOPHEN 500 MG
650 TABLET ORAL ONCE
Refills: 0 | Status: COMPLETED | OUTPATIENT
Start: 2019-09-01 | End: 2019-09-01

## 2019-09-01 RX ORDER — KETOROLAC TROMETHAMINE 30 MG/ML
15 SYRINGE (ML) INJECTION ONCE
Refills: 0 | Status: DISCONTINUED | OUTPATIENT
Start: 2019-09-01 | End: 2019-09-01

## 2019-09-01 RX ORDER — ONDANSETRON 8 MG/1
4 TABLET, FILM COATED ORAL ONCE
Refills: 0 | Status: COMPLETED | OUTPATIENT
Start: 2019-09-01 | End: 2019-09-01

## 2019-09-01 RX ORDER — SODIUM CHLORIDE 9 MG/ML
500 INJECTION INTRAMUSCULAR; INTRAVENOUS; SUBCUTANEOUS ONCE
Refills: 0 | Status: COMPLETED | OUTPATIENT
Start: 2019-09-01 | End: 2019-09-01

## 2019-09-01 RX ORDER — CEPHALEXIN 500 MG
1 CAPSULE ORAL
Qty: 14 | Refills: 0
Start: 2019-09-01 | End: 2019-09-07

## 2019-09-01 RX ORDER — CEFTRIAXONE 500 MG/1
1000 INJECTION, POWDER, FOR SOLUTION INTRAMUSCULAR; INTRAVENOUS ONCE
Refills: 0 | Status: COMPLETED | OUTPATIENT
Start: 2019-09-01 | End: 2019-09-01

## 2019-09-01 RX ORDER — IBUPROFEN 200 MG
600 TABLET ORAL ONCE
Refills: 0 | Status: COMPLETED | OUTPATIENT
Start: 2019-09-01 | End: 2019-09-01

## 2019-09-01 RX ORDER — SODIUM CHLORIDE 9 MG/ML
1000 INJECTION INTRAMUSCULAR; INTRAVENOUS; SUBCUTANEOUS ONCE
Refills: 0 | Status: COMPLETED | OUTPATIENT
Start: 2019-09-01 | End: 2019-09-01

## 2019-09-01 RX ORDER — ACETAMINOPHEN 500 MG
500 TABLET ORAL ONCE
Refills: 0 | Status: COMPLETED | OUTPATIENT
Start: 2019-09-01 | End: 2019-09-01

## 2019-09-01 RX ADMIN — Medication 500 MILLIGRAM(S): at 15:35

## 2019-09-01 RX ADMIN — Medication 650 MILLIGRAM(S): at 12:56

## 2019-09-01 RX ADMIN — ONDANSETRON 4 MILLIGRAM(S): 8 TABLET, FILM COATED ORAL at 12:09

## 2019-09-01 RX ADMIN — Medication 15 MILLIGRAM(S): at 19:01

## 2019-09-01 RX ADMIN — SODIUM CHLORIDE 500 MILLILITER(S): 9 INJECTION INTRAMUSCULAR; INTRAVENOUS; SUBCUTANEOUS at 15:35

## 2019-09-01 RX ADMIN — SODIUM CHLORIDE 1000 MILLILITER(S): 9 INJECTION INTRAMUSCULAR; INTRAVENOUS; SUBCUTANEOUS at 12:09

## 2019-09-01 RX ADMIN — CEFTRIAXONE 100 MILLIGRAM(S): 500 INJECTION, POWDER, FOR SOLUTION INTRAMUSCULAR; INTRAVENOUS at 13:45

## 2019-09-01 NOTE — ED ADULT TRIAGE NOTE - CHIEF COMPLAINT QUOTE
Patient c/o left sided flank pain radiating to her left back area, pain started this morning with nausea and vomiting.

## 2019-09-01 NOTE — ED PROVIDER NOTE - PROGRESS NOTE DETAILS
ED Attending: Steffen  Pt signed out to me from Dr. Bates to f/u and reassess.  Hydronephrosis related to fibroids d/w pt, she will see Gyn for f/u - understands she may need surgery.  Will also take Keflex as rx'd/ Pain improved.

## 2019-09-01 NOTE — ED PROVIDER NOTE - CLINICAL SUMMARY MEDICAL DECISION MAKING FREE TEXT BOX
44 y/o female with left sided flank pain, left CVA tenderness, discomfort, one episode of nausea and vomiting. Likely pyelo, doubt renal stone as pain not calculi. Consider pregnancy. Do HCG, UA, CBC, BMP and give IV fluid. Pt declines pain meds at this time. 44 y/o female with left sided flank pain, left CVA tenderness, discomfort, one episode of nausea and vomiting. Likely pyelo, doubt renal stone as pain not colicky.  If blood in urine, consider ct stone hunt if continued pain.  Consider pregnancy. Do HCG, UA, CBC, BMP and give IV fluid. Pt declines pain meds at this time.

## 2019-09-01 NOTE — ED PROVIDER NOTE - NSFOLLOWUPINSTRUCTIONS_ED_ALL_ED_FT
Your pain is likely caused by a urinary infection and also because it appears that your left kidney is a little back up because of fibroids. Please see a Gyn doctor for follow up. . You will need more treatment and maybe surgery. ALso, take antibiotic as prescribed for a urinary infection. You may call [phone] for an appointment with [facility].     Take Tylenol 650 mg every 6 hours as needed for pain.    Return to the ER for fevers, uncontrolled pain, vomiting, or other concerning signs.

## 2019-09-01 NOTE — ED ADULT NURSE REASSESSMENT NOTE - NS ED NURSE REASSESS COMMENT FT1
pt aox3. pt c/o headache, states hx of migraines. Dr. Bourne made aware. pt received Tylenol and Motrin. allergy to Reglan.
pt reports improvement in headache

## 2019-09-01 NOTE — ED PROVIDER NOTE - OBJECTIVE STATEMENT
42 y/o female with a history of HTN presents to the ED with left sided flank plank, dysuria, bladder discomfort, one episode of nausea and vomiting which started the past few days. Symptoms are consistent with past UTI. She reports has been drinking water to flush out the infection. But she felt worse today so came into the ED. Denies fever, chills or hematuria. Reports heat pack makes her feel better. 42 y/o female with a history of HTN presents to the ED with left sided flank pain, dysuria, bladder discomfort, one episode of nausea and vomiting which started the past few days. Symptoms are consistent with past UTI. She reports has been drinking water to flush out the infection. But she felt worse today so came into the ED. Denies fever, chills or hematuria. Reports heat pack makes her feel better.

## 2019-09-01 NOTE — ED PROVIDER NOTE - ABDOMINAL EXAM
Left CVA tenderness, abdomen soft and mildly tender over suprapubic region, no rebound guarding. No rash on flank

## 2019-09-01 NOTE — ED PROVIDER NOTE - PATIENT PORTAL LINK FT
You can access the FollowMyHealth Patient Portal offered by [facility] by registering at the following website: [email or web address]. By joining [facility]’s FollowMyHealth portal, you will also be able to view your health information using other applications (apps) compatible with our system.

## 2019-09-02 LAB — SPECIMEN SOURCE: SIGNIFICANT CHANGE UP

## 2019-09-03 LAB
-  AMIKACIN: SIGNIFICANT CHANGE UP
-  AMPICILLIN/SULBACTAM: SIGNIFICANT CHANGE UP
-  AMPICILLIN: SIGNIFICANT CHANGE UP
-  AZTREONAM: SIGNIFICANT CHANGE UP
-  CEFAZOLIN: SIGNIFICANT CHANGE UP
-  CEFEPIME: SIGNIFICANT CHANGE UP
-  CEFOXITIN: SIGNIFICANT CHANGE UP
-  CEFTAZIDIME: SIGNIFICANT CHANGE UP
-  CEFTRIAXONE: SIGNIFICANT CHANGE UP
-  CIPROFLOXACIN: SIGNIFICANT CHANGE UP
-  ERTAPENEM: SIGNIFICANT CHANGE UP
-  GENTAMICIN: SIGNIFICANT CHANGE UP
-  IMIPENEM: SIGNIFICANT CHANGE UP
-  LEVOFLOXACIN: SIGNIFICANT CHANGE UP
-  MEROPENEM: SIGNIFICANT CHANGE UP
-  NITROFURANTOIN: SIGNIFICANT CHANGE UP
-  PIPERACILLIN/TAZOBACTAM: SIGNIFICANT CHANGE UP
-  TIGECYCLINE: SIGNIFICANT CHANGE UP
-  TOBRAMYCIN: SIGNIFICANT CHANGE UP
-  TRIMETHOPRIM/SULFAMETHOXAZOLE: SIGNIFICANT CHANGE UP
BACTERIA UR CULT: SIGNIFICANT CHANGE UP
METHOD TYPE: SIGNIFICANT CHANGE UP
ORGANISM # SPEC MICROSCOPIC CNT: SIGNIFICANT CHANGE UP
ORGANISM # SPEC MICROSCOPIC CNT: SIGNIFICANT CHANGE UP

## 2021-02-19 NOTE — ED ADULT TRIAGE NOTE - NS ED TRIAGE HISTORIAN
Ammon Almaraz is a 4 month old male presenting  For well exam. Denies known Latex allergy or symptoms of Latex sensitivity. Takes no medications.   Health Maintenance Due   Topic Date Due   • IPV Vaccine (2 of 4 - 4-dose series) 02/15/2021   • HIB Vaccine (2 of 3 - PRP-OMP Series) 02/15/2021   • Rotavirus Vaccine (2 of 3 - 3-dose series) 02/15/2021   • DTaP/Tdap/Td Vaccine (2 - DTaP) 02/15/2021   • Well Child Exam 4 Months  02/15/2021   • Pneumococcal Vaccine 0-64 (2 of 4) 02/15/2021         Patient is due for the topics as listed above and wishes to proceed with them. Patient is following pediatric unified immunization schedule for immunizations.Child accompanied by mother  Mother's work status: part time  DIET:       both breast and bottle       Frequency: 4 and 6 ounces , 6 / day  SOLIDS: fruits  WATER: city  SLEEPING:       Where: in crib in own room       Position: back  STOOLS: 1 / day  IMMUNIZATION REACTIONS: NO  GROWTH AND DEVELOPMENT TOPICS:      Brings hands together - YES      Babbles, laughs aloud - YES      Head steady, sitting - YES      Follows 180 degrees - YES      Responds to noise - YES  MEDICATIONS: Patient is not currently taking any medication  CONCERNS: none          
Presents for 4 month checkup.    Parental concerns per nurse's note.    Social History:  Parents primary care givers.  Baby does not receive outside care.  Recent exposures to illness include:  none    Review of Systems:   Dermatologic:  No new moles, birthmarks, rashes.  Respiratory:  Free of uri sx, no sneeze, cough, wheeze.  Cardiac:  No spells involving cyanosis.   Gastrointestinal:  No constipation or diarrhea.  Neurologic:  Visually perceives objects or movement throughout environment, actively reaches for objects, holds, and moves them to mouth, seems to enjoy own vocalizations, vocalizes over a varied range of pitches.    Physical Examination:   Alert, bright, smiley baby.  HEENT:  Anterior fontanelle soft, non bulging.  Conjugate eye movements with rr x 2, tms clear, oropharynx moist.  Neck:  Supple, FROM.  Chest:  Symmetric.  Lungs:  Clear.  Cardiac:  Normoactive precordium, s1, s2 without murmur, pulses = bifemoral.  Abdomen:  Active bowel sounds, soft without organomegaly or masses.  Genitalia:  Morales 1.  Extremities:  Normal, hips free of clicks or subluxations.  Neurologic:  Bright and attentive, no posterior head lag, good axial strength and tone, negative parachute reflex.    Assessment:  Well Child    Plan:  Parental concerns addressed.  Anticipatory guidance patient handout provided with appropriate Tylenol dosing for weight.  Immunizations today:  PedvaxHIB, Prevnar, Pediarix, Rotateq  RV 2 months for 6 month cce or sooner prn interim concerns.    
Vaccine Information Statement(s) was given today. This has been reviewed, questions answered, and verbal consent given by Parent for injection(s) and administration of Multi Vaccines between birth and 6 months.    Patient tolerated without incident. See immunization grid for documentation.    
Patient

## 2022-04-25 ENCOUNTER — EMERGENCY (EMERGENCY)
Facility: HOSPITAL | Age: 47
LOS: 1 days | Discharge: HOME | End: 2022-04-25
Attending: EMERGENCY MEDICINE | Admitting: EMERGENCY MEDICINE
Payer: COMMERCIAL

## 2022-04-25 VITALS
TEMPERATURE: 98 F | HEART RATE: 66 BPM | OXYGEN SATURATION: 100 % | DIASTOLIC BLOOD PRESSURE: 90 MMHG | RESPIRATION RATE: 16 BRPM | HEIGHT: 64 IN | SYSTOLIC BLOOD PRESSURE: 134 MMHG

## 2022-04-25 VITALS
RESPIRATION RATE: 16 BRPM | SYSTOLIC BLOOD PRESSURE: 137 MMHG | HEART RATE: 68 BPM | DIASTOLIC BLOOD PRESSURE: 82 MMHG | OXYGEN SATURATION: 100 %

## 2022-04-25 DIAGNOSIS — K37 UNSPECIFIED APPENDICITIS: Chronic | ICD-10-CM

## 2022-04-25 DIAGNOSIS — O00.1 TUBAL PREGNANCY: Chronic | ICD-10-CM

## 2022-04-25 LAB
ALBUMIN SERPL ELPH-MCNC: 4 G/DL — SIGNIFICANT CHANGE UP (ref 3.3–5)
ALP SERPL-CCNC: 69 U/L — SIGNIFICANT CHANGE UP (ref 40–120)
ALT FLD-CCNC: 9 U/L — SIGNIFICANT CHANGE UP (ref 4–33)
ANION GAP SERPL CALC-SCNC: 9 MMOL/L — SIGNIFICANT CHANGE UP (ref 7–14)
APPEARANCE UR: CLEAR — SIGNIFICANT CHANGE UP
AST SERPL-CCNC: 17 U/L — SIGNIFICANT CHANGE UP (ref 4–32)
BACTERIA # UR AUTO: NEGATIVE — SIGNIFICANT CHANGE UP
BASOPHILS # BLD AUTO: 0.02 K/UL — SIGNIFICANT CHANGE UP (ref 0–0.2)
BASOPHILS NFR BLD AUTO: 0.4 % — SIGNIFICANT CHANGE UP (ref 0–2)
BILIRUB SERPL-MCNC: 0.2 MG/DL — SIGNIFICANT CHANGE UP (ref 0.2–1.2)
BILIRUB UR-MCNC: NEGATIVE — SIGNIFICANT CHANGE UP
BUN SERPL-MCNC: 9 MG/DL — SIGNIFICANT CHANGE UP (ref 7–23)
CALCIUM SERPL-MCNC: 8.9 MG/DL — SIGNIFICANT CHANGE UP (ref 8.4–10.5)
CHLORIDE SERPL-SCNC: 103 MMOL/L — SIGNIFICANT CHANGE UP (ref 98–107)
CO2 SERPL-SCNC: 27 MMOL/L — SIGNIFICANT CHANGE UP (ref 22–31)
COLOR SPEC: SIGNIFICANT CHANGE UP
CREAT SERPL-MCNC: 0.72 MG/DL — SIGNIFICANT CHANGE UP (ref 0.5–1.3)
DIFF PNL FLD: NEGATIVE — SIGNIFICANT CHANGE UP
EGFR: 104 ML/MIN/1.73M2 — SIGNIFICANT CHANGE UP
EOSINOPHIL # BLD AUTO: 0.13 K/UL — SIGNIFICANT CHANGE UP (ref 0–0.5)
EOSINOPHIL NFR BLD AUTO: 2.8 % — SIGNIFICANT CHANGE UP (ref 0–6)
EPI CELLS # UR: 0 /HPF — SIGNIFICANT CHANGE UP (ref 0–5)
GLUCOSE SERPL-MCNC: 98 MG/DL — SIGNIFICANT CHANGE UP (ref 70–99)
GLUCOSE UR QL: NEGATIVE — SIGNIFICANT CHANGE UP
HCG UR QL: NEGATIVE — SIGNIFICANT CHANGE UP
HCT VFR BLD CALC: 35.1 % — SIGNIFICANT CHANGE UP (ref 34.5–45)
HGB BLD-MCNC: 11.7 G/DL — SIGNIFICANT CHANGE UP (ref 11.5–15.5)
HYALINE CASTS # UR AUTO: 1 /LPF — SIGNIFICANT CHANGE UP (ref 0–7)
IANC: 1.85 K/UL — SIGNIFICANT CHANGE UP (ref 1.8–7.4)
IMM GRANULOCYTES NFR BLD AUTO: 0.4 % — SIGNIFICANT CHANGE UP (ref 0–1.5)
KETONES UR-MCNC: NEGATIVE — SIGNIFICANT CHANGE UP
LEUKOCYTE ESTERASE UR-ACNC: NEGATIVE — SIGNIFICANT CHANGE UP
LYMPHOCYTES # BLD AUTO: 2.31 K/UL — SIGNIFICANT CHANGE UP (ref 1–3.3)
LYMPHOCYTES # BLD AUTO: 49.1 % — HIGH (ref 13–44)
MCHC RBC-ENTMCNC: 26.5 PG — LOW (ref 27–34)
MCHC RBC-ENTMCNC: 33.3 GM/DL — SIGNIFICANT CHANGE UP (ref 32–36)
MCV RBC AUTO: 79.6 FL — LOW (ref 80–100)
MONOCYTES # BLD AUTO: 0.37 K/UL — SIGNIFICANT CHANGE UP (ref 0–0.9)
MONOCYTES NFR BLD AUTO: 7.9 % — SIGNIFICANT CHANGE UP (ref 2–14)
NEUTROPHILS # BLD AUTO: 1.85 K/UL — SIGNIFICANT CHANGE UP (ref 1.8–7.4)
NEUTROPHILS NFR BLD AUTO: 39.4 % — LOW (ref 43–77)
NITRITE UR-MCNC: NEGATIVE — SIGNIFICANT CHANGE UP
NRBC # BLD: 0 /100 WBCS — SIGNIFICANT CHANGE UP
NRBC # FLD: 0 K/UL — SIGNIFICANT CHANGE UP
PH UR: 8.5 — HIGH (ref 5–8)
PLATELET # BLD AUTO: 310 K/UL — SIGNIFICANT CHANGE UP (ref 150–400)
POTASSIUM SERPL-MCNC: 4 MMOL/L — SIGNIFICANT CHANGE UP (ref 3.5–5.3)
POTASSIUM SERPL-SCNC: 4 MMOL/L — SIGNIFICANT CHANGE UP (ref 3.5–5.3)
PROT SERPL-MCNC: 7.1 G/DL — SIGNIFICANT CHANGE UP (ref 6–8.3)
PROT UR-MCNC: ABNORMAL
RBC # BLD: 4.41 M/UL — SIGNIFICANT CHANGE UP (ref 3.8–5.2)
RBC # FLD: 16 % — HIGH (ref 10.3–14.5)
RBC CASTS # UR COMP ASSIST: 1 /HPF — SIGNIFICANT CHANGE UP (ref 0–4)
SARS-COV-2 RNA SPEC QL NAA+PROBE: SIGNIFICANT CHANGE UP
SODIUM SERPL-SCNC: 139 MMOL/L — SIGNIFICANT CHANGE UP (ref 135–145)
SP GR SPEC: 1.02 — SIGNIFICANT CHANGE UP (ref 1–1.05)
TROPONIN T, HIGH SENSITIVITY RESULT: <6 NG/L — SIGNIFICANT CHANGE UP
UROBILINOGEN FLD QL: SIGNIFICANT CHANGE UP
WBC # BLD: 4.7 K/UL — SIGNIFICANT CHANGE UP (ref 3.8–10.5)
WBC # FLD AUTO: 4.7 K/UL — SIGNIFICANT CHANGE UP (ref 3.8–10.5)
WBC UR QL: 0 /HPF — SIGNIFICANT CHANGE UP (ref 0–5)

## 2022-04-25 PROCEDURE — 71046 X-RAY EXAM CHEST 2 VIEWS: CPT | Mod: 26

## 2022-04-25 PROCEDURE — 93010 ELECTROCARDIOGRAM REPORT: CPT | Mod: 59

## 2022-04-25 PROCEDURE — 70450 CT HEAD/BRAIN W/O DYE: CPT | Mod: 26,MA

## 2022-04-25 PROCEDURE — 99285 EMERGENCY DEPT VISIT HI MDM: CPT | Mod: 25

## 2022-04-25 RX ORDER — MECLIZINE HCL 12.5 MG
25 TABLET ORAL ONCE
Refills: 0 | Status: COMPLETED | OUTPATIENT
Start: 2022-04-25 | End: 2022-04-25

## 2022-04-25 RX ORDER — MECLIZINE HCL 12.5 MG
1 TABLET ORAL
Qty: 12 | Refills: 0
Start: 2022-04-25 | End: 2022-04-27

## 2022-04-25 RX ORDER — ONDANSETRON 8 MG/1
4 TABLET, FILM COATED ORAL ONCE
Refills: 0 | Status: COMPLETED | OUTPATIENT
Start: 2022-04-25 | End: 2022-04-25

## 2022-04-25 RX ORDER — SODIUM CHLORIDE 9 MG/ML
1000 INJECTION INTRAMUSCULAR; INTRAVENOUS; SUBCUTANEOUS ONCE
Refills: 0 | Status: COMPLETED | OUTPATIENT
Start: 2022-04-25 | End: 2022-04-25

## 2022-04-25 RX ADMIN — SODIUM CHLORIDE 1000 MILLILITER(S): 9 INJECTION INTRAMUSCULAR; INTRAVENOUS; SUBCUTANEOUS at 11:19

## 2022-04-25 RX ADMIN — Medication 25 MILLIGRAM(S): at 11:19

## 2022-04-25 RX ADMIN — ONDANSETRON 4 MILLIGRAM(S): 8 TABLET, FILM COATED ORAL at 11:19

## 2022-04-25 NOTE — ED ADULT NURSE NOTE - OBJECTIVE STATEMENT
Pt. A&OX4, c/o lightheadedness and near-syncopal episode. Denies headache, cp, sob, n/v/d or fevers. #20g IV placed to right AC, labs sent. MD at bedside for eval. Vitals stable in triage. Will continue to monitor.

## 2022-04-25 NOTE — ED PROVIDER NOTE - ATTENDING CONTRIBUTION TO CARE
Patient is a 45 yo F with history of DM, HTN here for evaluation of intermittent dizziness. She also complains of right ear fullness and states she has had right ear infection in the past. No fevers. She also states that she recently had a tooth extracted on her right side on 4/21/22. She states she has been feeling dizzy, reports she was at work and sat down and feels that she blacked out for second. She states she recalls the entire event. She did not fall. No head trauma. Patient reports room spinning sensation intermittently since then. No vomiting. + nausea. She has had vertigo in the past but it was years ago. No focal weakness. No chest pain or shortness of breath. She states she has a headache.     VS noted  Gen. no acute distress, Non toxic   HEENT: EOMI, mmm, bilateral TM normal, dentition without any obvious caries, right lower tooth - area of extraction observed without any swelling or pus, no palpable abscess  Lungs: CTAB/L no C/ W /R   CVS: RRR   Abd; Soft non tender, non distended   Ext: no edema  Skin: no rash  Neuro AAOx3, CN 2-12 intact, strength is 5/5 in UE/LE, finger to nose normal, gait normal, clear speech  a/p: vertigo - pt with normal neuro exam, including normal gait. Given episode of syncope, will check ekg, labs. Will give meclizine, IVF. Low suspicion for ICH/ aneurysm. Will check CT Head.   - Philippe AARON

## 2022-04-25 NOTE — ED ADULT TRIAGE NOTE - CHIEF COMPLAINT QUOTE
c/o syncopal episode on Friday, +LOC, denies hitting head, since has had intermittent dizziness. Speaking in full cleat sentences, no facial droop noted. Denies chest pain, n/v. Hx of DMT2, HTN

## 2022-04-25 NOTE — ED PROVIDER NOTE - PATIENT PORTAL LINK FT
You can access the FollowMyHealth Patient Portal offered by [facility] by registering at the following website: [email or web address]. By joining [email or web address]’s [email or web address] portal, you will also be able to view your health information using other applications (apps) compatible with our system.

## 2022-04-25 NOTE — ED PROVIDER NOTE - PHYSICAL EXAMINATION
Gen: Patient is well-appearing, NAD, AAOx3, able to ambulate without assistance  HEENT: NCAT, EOMI, PEERLA, normal conjunctiva, tongue midline, oral mucosa moist, normal ear exam bilaterally, no discharge/lesion noted   Lung: CTAB, no respiratory distress, no wheezes/rhonchi/rales B/L, speaking in full sentences  CV: RRR, no murmurs, rubs or gallops, distal pulses 2+ b/l  Abd: soft, NT, ND, no guarding, no rigidity, no rebound tenderness, no CVA tenderness   MSK: no visible deformities, ROM normal in UE/LE, no back TTP  Neuro: No focal sensory or motor deficits, normal CN exam, normal coordination, normal gait   Skin: Warm, well perfused, no rash, no leg swelling  Psych: normal affect, calm

## 2022-04-25 NOTE — ED PROVIDER NOTE - CLINICAL SUMMARY MEDICAL DECISION MAKING FREE TEXT BOX
46 y F, hx of HTN, DM, remote hx of R ear otitis media, vertigo, presenting with 4-days onset of dizziness, R ear fullness. Reports syncope episode three days ago. On exam, patient well appearing, NAD, normal ear exam, normal S1 and S2 on cardiac exam, CTABL, no abdominal tenderness, no peripheral edema, normal CN exam, no focal neurological deficit. Will get labs, EKG, CT head, symptomatic management with zofran, meclizine, reassess.

## 2022-04-25 NOTE — ED PROVIDER NOTE - NSFOLLOWUPINSTRUCTIONS_ED_ALL_ED_FT
You may take the prescribed medication Meclizine 25 mg every 6 hours as needed for vertigo.     Please follow up with the Neurology team in the outpatient setting within the next 3-5 days to monitor your symptoms.     Please come back to the Emergency Room if your symptoms get worse or if you start developing severe dizziness, unable to walk, numbness, weakness.

## 2022-04-25 NOTE — ED PROVIDER NOTE - NS ED ROS FT
Constitutional:  (-) fever, (-) chills, (-) lethargy  Eyes:  (-) eye pain (-) visual changes  ENMT: (-) nasal discharge, (-) sore throat. (-) neck pain or stiffness  Cardiac: (-) chest pain (-) palpitations  Respiratory:  (-) cough (-) respiratory distress.   GI:  (-) nausea (-) vomiting (-) diarrhea (-) abdominal pain.  :  (-) dysuria (-) frequency (-) burning.  MS:  (-) back pain (-) joint pain.  Neuro:  (+) dizziness (-) headache (-) numbness (-) tingling (-) focal weakness  Skin:  (-) rash  Except as documented in the HPI,  all other systems are negative

## 2022-04-25 NOTE — ED ADULT NURSE NOTE - HOW OFTEN DO YOU HAVE A DRINK CONTAINING ALCOHOL?
Billing Type: Third-Party Bill Bill For Surgical Tray: no Lab Facility: [id number] Performing Laboratory: [id number] Expected Date Of Service: 12/11/2020 Never

## 2022-04-25 NOTE — ED PROVIDER NOTE - OBJECTIVE STATEMENT
46 y F, hx of HTN, DM, remote hx of R ear otitis media, vertigo, presenting with 4-days onset of dizziness, R ear fullness. Patient reports that she had her tooth extracted on 04/21. On the following day while at work, as she was walking, she started feeling dizzy and blacked out for a second. Her co-worker was next to her and was able to assist her. Patient did not fall, no head injury. Since then, patient has been having episodes of dizziness which she describes it as room spinning sensation especially with positional changes. Reports associated symptoms of nausea, dysuria.

## 2022-04-25 NOTE — ED PROVIDER NOTE - NSFOLLOWUPCLINICS_GEN_ALL_ED_FT
[facility]  Neurology  [address] [place] [address] - 3[address] [place] [address]  Phone: [phone]  Fax:   Follow Up Time: 4-6 Days

## 2022-04-25 NOTE — ED PROVIDER NOTE - PROGRESS NOTE DETAILS
[facility]xton PGY1: patient reassessed. Patient appears comfortable and symptomatically improved. Reports symptomatic improvement with meclizine. Patient will follow up with Neurology in the outpatient setting.

## 2022-05-12 ENCOUNTER — APPOINTMENT (OUTPATIENT)
Dept: OPHTHALMOLOGY | Facility: CLINIC | Age: 47
End: 2022-05-12

## 2022-05-19 ENCOUNTER — APPOINTMENT (OUTPATIENT)
Dept: OPHTHALMOLOGY | Facility: CLINIC | Age: 47
End: 2022-05-19
Payer: COMMERCIAL

## 2022-05-19 ENCOUNTER — NON-APPOINTMENT (OUTPATIENT)
Age: 47
End: 2022-05-19

## 2022-05-19 PROCEDURE — 92004 COMPRE OPH EXAM NEW PT 1/>: CPT

## 2022-05-19 PROCEDURE — 92015 DETERMINE REFRACTIVE STATE: CPT

## 2022-05-25 ENCOUNTER — APPOINTMENT (OUTPATIENT)
Dept: OTOLARYNGOLOGY | Facility: CLINIC | Age: 47
End: 2022-05-25
Payer: COMMERCIAL

## 2022-05-25 VITALS
BODY MASS INDEX: 37.56 KG/M2 | SYSTOLIC BLOOD PRESSURE: 137 MMHG | WEIGHT: 220 LBS | HEART RATE: 85 BPM | HEIGHT: 64 IN | TEMPERATURE: 97.4 F | DIASTOLIC BLOOD PRESSURE: 90 MMHG

## 2022-05-25 DIAGNOSIS — R05.3 CHRONIC COUGH: ICD-10-CM

## 2022-05-25 DIAGNOSIS — R07.0 PAIN IN THROAT: ICD-10-CM

## 2022-05-25 DIAGNOSIS — E07.89 OTHER SPECIFIED DISORDERS OF THYROID: ICD-10-CM

## 2022-05-25 DIAGNOSIS — K21.9 GASTRO-ESOPHAGEAL REFLUX DISEASE W/OUT ESOPHAGITIS: ICD-10-CM

## 2022-05-25 PROCEDURE — 31575 DIAGNOSTIC LARYNGOSCOPY: CPT

## 2022-05-25 PROCEDURE — 99204 OFFICE O/P NEW MOD 45 MIN: CPT | Mod: 25

## 2022-05-25 NOTE — CONSULT LETTER
[Dear  ___] : Dear  [unfilled], [Consult Letter:] : I had the pleasure of evaluating your patient, [unfilled]. [Please see my note below.] : Please see my note below. [Consult Closing:] : Thank you very much for allowing me to participate in the care of this patient.  If you have any questions, please do not hesitate to contact me. [Sincerely,] : Sincerely, [FreeTextEntry3] : Radha Wood MD\par Otolaryngology and Cranial Base Surgery\par Attending Physician - Department of Otolaryngology and Head & Neck Surgery\par Health system\par  - [employer] [facility]\par Office: [phone]\par Fax: [phone]\par

## 2022-05-25 NOTE — PROCEDURE
[de-identified] : Flexible scope #38 used. Passed through nasal passage and nasopharynx/oropharynx/hypopharynx clear. Supraglottis normal. Glottis with fully mobile vocal cords without lesions or masses. Visualized subglottis clear. Postcricoid area with erythema and edema greater on right arytenoid than left. No pooling of secretions.\par Photos taken

## 2022-05-25 NOTE — HISTORY OF PRESENT ILLNESS
[de-identified] : 47 y/o F with over 2 months of dry cough and sensation of something in the throat.  She notes when moving neck it triggers  cough.  She also notes coughing with eating or drinking, not like she is choking, but just anything in the throat triggers a cough reflex.   No SOB, no trouble swallowing / getting food down. \par Has seen a pulmonologist, had PFT, awaiting results.  Started on Breo.  Also had short course of steroids with minimal improvement while on them. \par CXR 2/21/22 - unremarkable.

## 2022-05-25 NOTE — PHYSICAL EXAM
[Midline] : trachea located in midline position [Normal] : no rashes [de-identified] : palpable right thyroid nodule

## 2022-05-25 NOTE — ASSESSMENT
[FreeTextEntry1] : Chronic cough and throat discomfort, Does have some right thyroid fullness.  LPRD on laryngoscopy with worse erythema on Right arytenoid. \par - Reviewed chest xray form 2/21/22, Normal exam.  \par - reflux precautions, handout given\par - Omeprazole 40 mg x 1 month\par - Famotidine QHS x 2 months \par - F/U if symptoms not improving after one month of treatment. \par \par right thyroid nodule:\par - Will get thyroid US - will call with results\par \par \par \par \par I personally saw and examined Ms. QUETA CHRISTIAN in detail this visit today. I personally reviewed the HPI, PMH, FH, SH, ROS and medications/allergies. I have spoken to KAITY Malagon regarding the history and have personally determined the assessment and plan of care, and documented this myself. I was present and participated in all key portions of the encounter and all procedures noted above. I have made changes in the body of the note where appropriate.\par \par Attesting Faculty: See Attending Signature Below

## 2022-06-09 ENCOUNTER — APPOINTMENT (OUTPATIENT)
Dept: ULTRASOUND IMAGING | Facility: CLINIC | Age: 47
End: 2022-06-09

## 2022-06-18 ENCOUNTER — APPOINTMENT (OUTPATIENT)
Dept: ULTRASOUND IMAGING | Facility: CLINIC | Age: 47
End: 2022-06-18

## 2022-06-18 PROCEDURE — 76536 US EXAM OF HEAD AND NECK: CPT

## 2022-06-23 ENCOUNTER — NON-APPOINTMENT (OUTPATIENT)
Age: 47
End: 2022-06-23

## 2022-07-25 ENCOUNTER — APPOINTMENT (OUTPATIENT)
Dept: NEUROLOGY | Facility: CLINIC | Age: 47
End: 2022-07-25

## 2022-07-27 ENCOUNTER — APPOINTMENT (OUTPATIENT)
Dept: OTOLARYNGOLOGY | Facility: CLINIC | Age: 47
End: 2022-07-27

## 2023-06-26 ENCOUNTER — EMERGENCY (EMERGENCY)
Facility: HOSPITAL | Age: 48
LOS: 1 days | Discharge: HOME | End: 2023-06-26
Attending: STUDENT IN AN ORGANIZED HEALTH CARE EDUCATION/TRAINING PROGRAM | Admitting: STUDENT IN AN ORGANIZED HEALTH CARE EDUCATION/TRAINING PROGRAM
Payer: COMMERCIAL

## 2023-06-26 VITALS
HEART RATE: 60 BPM | OXYGEN SATURATION: 100 % | TEMPERATURE: 98 F | RESPIRATION RATE: 18 BRPM | DIASTOLIC BLOOD PRESSURE: 93 MMHG | SYSTOLIC BLOOD PRESSURE: 130 MMHG

## 2023-06-26 VITALS
RESPIRATION RATE: 16 BRPM | TEMPERATURE: 97 F | DIASTOLIC BLOOD PRESSURE: 92 MMHG | OXYGEN SATURATION: 100 % | SYSTOLIC BLOOD PRESSURE: 156 MMHG | HEART RATE: 88 BPM

## 2023-06-26 DIAGNOSIS — K37 UNSPECIFIED APPENDICITIS: Chronic | ICD-10-CM

## 2023-06-26 DIAGNOSIS — O00.1 TUBAL PREGNANCY: Chronic | ICD-10-CM

## 2023-06-26 LAB
ALBUMIN SERPL ELPH-MCNC: 4.3 G/DL — SIGNIFICANT CHANGE UP (ref 3.3–5)
ALP SERPL-CCNC: 91 U/L — SIGNIFICANT CHANGE UP (ref 40–120)
ALT FLD-CCNC: 19 U/L — SIGNIFICANT CHANGE UP (ref 4–33)
ANION GAP SERPL CALC-SCNC: 15 MMOL/L — HIGH (ref 7–14)
AST SERPL-CCNC: 42 U/L — HIGH (ref 4–32)
BASOPHILS # BLD AUTO: 0.03 K/UL — SIGNIFICANT CHANGE UP (ref 0–0.2)
BASOPHILS NFR BLD AUTO: 0.6 % — SIGNIFICANT CHANGE UP (ref 0–2)
BILIRUB SERPL-MCNC: 0.4 MG/DL — SIGNIFICANT CHANGE UP (ref 0.2–1.2)
BUN SERPL-MCNC: 11 MG/DL — SIGNIFICANT CHANGE UP (ref 7–23)
CALCIUM SERPL-MCNC: 10 MG/DL — SIGNIFICANT CHANGE UP (ref 8.4–10.5)
CHLORIDE SERPL-SCNC: 99 MMOL/L — SIGNIFICANT CHANGE UP (ref 98–107)
CO2 SERPL-SCNC: 22 MMOL/L — SIGNIFICANT CHANGE UP (ref 22–31)
CREAT SERPL-MCNC: 0.6 MG/DL — SIGNIFICANT CHANGE UP (ref 0.5–1.3)
EGFR: 111 ML/MIN/1.73M2 — SIGNIFICANT CHANGE UP
EOSINOPHIL # BLD AUTO: 0.02 K/UL — SIGNIFICANT CHANGE UP (ref 0–0.5)
EOSINOPHIL NFR BLD AUTO: 0.4 % — SIGNIFICANT CHANGE UP (ref 0–6)
FLUAV AG NPH QL: SIGNIFICANT CHANGE UP
FLUBV AG NPH QL: SIGNIFICANT CHANGE UP
GLUCOSE SERPL-MCNC: 122 MG/DL — HIGH (ref 70–99)
HCT VFR BLD CALC: 38 % — SIGNIFICANT CHANGE UP (ref 34.5–45)
HGB BLD-MCNC: 12.6 G/DL — SIGNIFICANT CHANGE UP (ref 11.5–15.5)
IANC: 3.89 K/UL — SIGNIFICANT CHANGE UP (ref 1.8–7.4)
IMM GRANULOCYTES NFR BLD AUTO: 0.2 % — SIGNIFICANT CHANGE UP (ref 0–0.9)
LYMPHOCYTES # BLD AUTO: 1.03 K/UL — SIGNIFICANT CHANGE UP (ref 1–3.3)
LYMPHOCYTES # BLD AUTO: 19.5 % — SIGNIFICANT CHANGE UP (ref 13–44)
MAGNESIUM SERPL-MCNC: 1.9 MG/DL — SIGNIFICANT CHANGE UP (ref 1.6–2.6)
MCHC RBC-ENTMCNC: 26.5 PG — LOW (ref 27–34)
MCHC RBC-ENTMCNC: 33.2 GM/DL — SIGNIFICANT CHANGE UP (ref 32–36)
MCV RBC AUTO: 80 FL — SIGNIFICANT CHANGE UP (ref 80–100)
MONOCYTES # BLD AUTO: 0.3 K/UL — SIGNIFICANT CHANGE UP (ref 0–0.9)
MONOCYTES NFR BLD AUTO: 5.7 % — SIGNIFICANT CHANGE UP (ref 2–14)
NEUTROPHILS # BLD AUTO: 3.89 K/UL — SIGNIFICANT CHANGE UP (ref 1.8–7.4)
NEUTROPHILS NFR BLD AUTO: 73.6 % — SIGNIFICANT CHANGE UP (ref 43–77)
NRBC # BLD: 0 /100 WBCS — SIGNIFICANT CHANGE UP (ref 0–0)
NRBC # FLD: 0 K/UL — SIGNIFICANT CHANGE UP (ref 0–0)
PHOSPHATE SERPL-MCNC: 4.1 MG/DL — SIGNIFICANT CHANGE UP (ref 2.5–4.5)
PLATELET # BLD AUTO: 254 K/UL — SIGNIFICANT CHANGE UP (ref 150–400)
POTASSIUM SERPL-MCNC: 5.1 MMOL/L — SIGNIFICANT CHANGE UP (ref 3.5–5.3)
POTASSIUM SERPL-SCNC: 5.1 MMOL/L — SIGNIFICANT CHANGE UP (ref 3.5–5.3)
PROT SERPL-MCNC: 7.9 G/DL — SIGNIFICANT CHANGE UP (ref 6–8.3)
RBC # BLD: 4.75 M/UL — SIGNIFICANT CHANGE UP (ref 3.8–5.2)
RBC # FLD: 14.4 % — SIGNIFICANT CHANGE UP (ref 10.3–14.5)
RSV RNA NPH QL NAA+NON-PROBE: SIGNIFICANT CHANGE UP
SARS-COV-2 RNA SPEC QL NAA+PROBE: SIGNIFICANT CHANGE UP
SODIUM SERPL-SCNC: 136 MMOL/L — SIGNIFICANT CHANGE UP (ref 135–145)
TSH SERPL-MCNC: 0.61 UIU/ML — SIGNIFICANT CHANGE UP (ref 0.27–4.2)
WBC # BLD: 5.28 K/UL — SIGNIFICANT CHANGE UP (ref 3.8–10.5)
WBC # FLD AUTO: 5.28 K/UL — SIGNIFICANT CHANGE UP (ref 3.8–10.5)

## 2023-06-26 PROCEDURE — 93010 ELECTROCARDIOGRAM REPORT: CPT

## 2023-06-26 PROCEDURE — 99285 EMERGENCY DEPT VISIT HI MDM: CPT

## 2023-06-26 PROCEDURE — 71046 X-RAY EXAM CHEST 2 VIEWS: CPT | Mod: 26

## 2023-06-26 RX ORDER — FAMOTIDINE 10 MG/ML
20 INJECTION INTRAVENOUS ONCE
Refills: 0 | Status: COMPLETED | OUTPATIENT
Start: 2023-06-26 | End: 2023-06-26

## 2023-06-26 RX ORDER — SODIUM CHLORIDE 9 MG/ML
1000 INJECTION INTRAMUSCULAR; INTRAVENOUS; SUBCUTANEOUS ONCE
Refills: 0 | Status: COMPLETED | OUTPATIENT
Start: 2023-06-26 | End: 2023-06-26

## 2023-06-26 RX ADMIN — Medication 30 MILLILITER(S): at 16:58

## 2023-06-26 RX ADMIN — SODIUM CHLORIDE 1000 MILLILITER(S): 9 INJECTION INTRAMUSCULAR; INTRAVENOUS; SUBCUTANEOUS at 15:12

## 2023-06-26 RX ADMIN — FAMOTIDINE 20 MILLIGRAM(S): 10 INJECTION INTRAVENOUS at 16:58

## 2023-06-26 NOTE — ED PROVIDER NOTE - OBJECTIVE STATEMENT
47yoF PMH HTN, pre-DM on metformin, p/w nausea, lightheadedness, and near syncopal episode. 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 pt states she thought her sugar might be low and ultimately had some orange juice, checked her fingerstick after and it was only 105 after OJ. Pt does also admit to episode of R sided sore throat as well and thought she noticed a lump on her neck yesterday as well. no night sweats, weight loss, recent travel. pt denies any cp, sob, dizziness, palpitations or diaphoresis. Pt denies fevers, cough, sore throat. Has seen cardiologist just prior to COVID, had normal NST at that time. Has family hx of cardiomegaly in her father, not premature. Pt denies recent travel, surgeries, calf pain, smoking, leg swelling, hx of DVT/PE.

## 2023-06-26 NOTE — ED ADULT NURSE NOTE - NSFALLUNIVINTERV_ED_ALL_ED
Bed/Stretcher in lowest position, wheels locked, appropriate side rails in place/Call bell, personal items and telephone in reach/Instruct patient to call for assistance before getting out of bed/chair/stretcher/Non-slip footwear applied when patient is off stretcher/[place] to call system/Physically safe environment - no spills, clutter or unnecessary equipment/Purposeful proactive rounding/Room/bathroom lighting operational, light cord in reach

## 2023-06-26 NOTE — ED PROVIDER NOTE - PATIENT PORTAL LINK FT
You can access the FollowMyHealth Patient Portal offered by [facility] by registering at the following website: [email or web address]. By joining [employer]’s FollowMyHealth portal, you will also be able to view your health information using other applications (apps) compatible with our system.

## 2023-06-26 NOTE — ED PROVIDER NOTE - PROGRESS NOTE DETAILS
pt reassessed, feeling significantly better. pending XR read. labs wnl, trop<6, TSH wnl, lipase negative, CXR negative, ECG NSR noischemic  pt feeling well, wants to g ohome. will dc.   Return precautions discussed.

## 2023-06-26 NOTE — ED ADULT NURSE NOTE - OBJECTIVE STATEMENT
pt A&ox4, coming to ED from home for Nausea and 3 episodes of nonbloody emesis today. past medical history: HTN. pt denies fevers, chills at home. pt denies Chest pain and SOB. pt denies H/A , Dizziness , lightheadedness , and radiating chest pain.  breathing is spontaneous and unlabored. sating 99% on RA. right forearm 20g IV placed Labs drawn and sent as per ordered. Bed in lowest position, call bell within reach, all other safety and comfort measures provided. awaiting labs results and further orders.

## 2023-06-26 NOTE — ED ADULT TRIAGE NOTE - CHIEF COMPLAINT QUOTE
nausea and vomiting x 1 day, non-bloody, pt feels faint, hx of HTN nausea and vomiting x 1 day, non-bloody, hx of HTN

## 2023-06-26 NOTE — ED PROVIDER NOTE - ATTENDING APP SHARED VISIT CONTRIBUTION OF CARE
46 yo F hx HTN, pre-diabetes on Metformin (A1C 6.4), migraines, presenting s/p pre-syncopal episode with complaints of nausea and epigastric discomfort. Pt states she was with a patient when she developed R sided headache with blurred vision on R side with nausea and feeling like she was going to pass out. One episode nausea/vomiting following episode. She drank orange juice, finger stick after this time was 105. She took Tylenol which improved the headache. Denies chest pain/sob, palpitations, lower extremity swelling/pain. Pt notes neck swelling on R side which is has been there. Denies recent URI symptoms, no falls/trauma. On exam, well appearing, NAD, heart rrr, lungs ctab, R sided soft tissue neck swelling over SCM muscle, without noted crepitus, no lymphadenopathy, no carotid bruits, FROM neck. Abd soft with mild epigastric ttp. Plan for labs, ekg, cxr, fluids, and reassess    DDX: migraine, pre-syncope, acs, hypoglycemic episode,

## 2023-06-26 NOTE — ED PROVIDER NOTE - PHYSICAL EXAMINATION
CONSTITUTIONAL: Well-appearing; well-nourished; in no apparent distress;  HEAD: Normocephalic, atraumatic;  EYES: PERRL, EOM intact, conjunctiva and sclera WNL;  ENT: normal nose; no rhinorrhea; unremarkable pharynx  NECK/LYMPH: Supple; non-tender; ?nodular region just R lateral of midline, palpable along SCM.   CARD: Normal S1, S2; no murmurs, rubs, or gallops noted  RESP: Normal chest excursion with respiration; breath sounds clear and equal bilaterally; no wheezes, rhonchi, or rales noted  ABD/GI: soft, non-distended; non-tender; no palpable organomegaly, no pulsatile mass  EXT/MS: moves all extremities; distal pulses are normal, no pedal edema  SKIN: Normal for age and race; warm; dry; good turgor; no apparent lesions or exudate noted  NEURO: Awake, alert, oriented x 3, no gross deficits, CN II-XII grossly intact, no motor or sensory deficit noted  PSYCH: Normal mood; appropriate affect

## 2023-06-26 NOTE — ED PROVIDER NOTE - CLINICAL SUMMARY MEDICAL DECISION MAKING FREE TEXT BOX
47yoF PMH HTN, pre-DM on metformin, p/w nausea, lightheadedness, and near syncopal episode. 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 pt states she thought her sugar might be low and ultimately had some orange juice, checked her fingerstick after and it was only 105 after OJ. Pt does also admit to episode of R sided sore throat as well and thought she noticed a lump on her neck yesterday as well. no night sweats, weight loss, recent travel. pt denies any cp, sob, dizziness, palpitations or diaphoresis. Pt denies fevers, cough, sore throat. Has seen cardiologist just prior to COVID, had normal NST at that time. Has family hx of cardiomegaly in her father, not premature. Pt denies recent travel, surgeries, calf pain, smoking, leg swelling, hx of DVT/PE.    PLAN: will check labs, r/o electrolyte abnormality vs dehydration, orthostatics, supsect likely syncope, possible hypoglycemia. will check ECG although do not have high suspicion ACS, will check TFT's as well.

## 2023-08-05 ENCOUNTER — EMERGENCY (EMERGENCY)
Facility: HOSPITAL | Age: 48
LOS: 1 days | Discharge: HOME | End: 2023-08-05
Attending: EMERGENCY MEDICINE | Admitting: EMERGENCY MEDICINE
Payer: COMMERCIAL

## 2023-08-05 VITALS
DIASTOLIC BLOOD PRESSURE: 83 MMHG | TEMPERATURE: 98 F | HEART RATE: 69 BPM | SYSTOLIC BLOOD PRESSURE: 125 MMHG | RESPIRATION RATE: 16 BRPM | OXYGEN SATURATION: 100 %

## 2023-08-05 DIAGNOSIS — O00.1 TUBAL PREGNANCY: Chronic | ICD-10-CM

## 2023-08-05 DIAGNOSIS — K37 UNSPECIFIED APPENDICITIS: Chronic | ICD-10-CM

## 2023-08-05 PROCEDURE — 99284 EMERGENCY DEPT VISIT MOD MDM: CPT

## 2023-08-05 NOTE — ED ADULT TRIAGE NOTE - CHIEF COMPLAINT QUOTE
fall    pt had mechanical trip and fall down several steps while going into the garage.  struck head x2. states may have blacked out a couple of seconds.  c/o slight numbness to left arm since fall. equal hand strength.   past medical history- prediabetes on metformin, htn

## 2023-08-06 VITALS
HEART RATE: 60 BPM | OXYGEN SATURATION: 100 % | DIASTOLIC BLOOD PRESSURE: 87 MMHG | RESPIRATION RATE: 16 BRPM | TEMPERATURE: 98 F | SYSTOLIC BLOOD PRESSURE: 129 MMHG

## 2023-08-06 PROCEDURE — G1004: CPT

## 2023-08-06 PROCEDURE — 73502 X-RAY EXAM HIP UNI 2-3 VIEWS: CPT | Mod: 26,RT

## 2023-08-06 PROCEDURE — 70450 CT HEAD/BRAIN W/O DYE: CPT | Mod: 26,MF

## 2023-08-06 RX ORDER — ACETAMINOPHEN 500 MG
650 TABLET ORAL ONCE
Refills: 0 | Status: COMPLETED | OUTPATIENT
Start: 2023-08-06 | End: 2023-08-06

## 2023-08-06 RX ADMIN — Medication 650 MILLIGRAM(S): at 00:40

## 2023-08-06 NOTE — ED PROVIDER NOTE - OBJECTIVE STATEMENT
pt is a 47 year old female with pmhx of hypertension who presents after a fall from 6+ steps onto concrete, in which she hit her R. parietal scalp and forehead and her right hip. PT endorses seconds of LOC with quick return to conciousness. PT reports no confusion or blood thinner usage. Pt denies palpitations, dizziness, nausea, lapse in memory of event.

## 2023-08-06 NOTE — ED ADULT NURSE NOTE - NSFALLRISKINTERV_ED_ALL_ED

## 2023-08-06 NOTE — ED PROVIDER NOTE - PHYSICAL EXAMINATION
PHYSICAL EXAM:  Primary survey intact  GCS 15    GENERAL: NAD  HEENT:  Atraumatic, no abrasions, depressions. no thompson, raccoon sign    CHEST/LUNG: Chest rise equal bilaterally, no respiratory distress,  HEART: Regular rate and rhythm, no murmurs  ABDOMEN: Soft, Nontender, Nondistended  EXTREMITIES:  Extremities warm  PSYCH: A&Ox3  SKIN: No obvious rashes or lesions    MSK: No cervical spine TTP, able to range neck to the left and right/ No midline spinal TTP  There is tenderness to the right lateral hip    NEUROLOGY: strength and sensation intact in all extremities. CN 2 - 12 intact. Finger to nose test intact. No pronator drift. Ambulatory without difficulty.

## 2023-08-06 NOTE — ED ADULT NURSE REASSESSMENT NOTE - NS ED NURSE REASSESS COMMENT FT1
pt resting comfortably in stretcher at this time, offering no complaints at this time. respirations even and unlabored. VS as noted in flowsheet. awaiting XR results. safety maintained, side rails up

## 2023-08-06 NOTE — ED PROVIDER NOTE - CLINICAL SUMMARY MEDICAL DECISION MAKING FREE TEXT BOX
pt is a 47 year old female with pmhx of hypertension who presents after a fall from 6+ steps onto concrete, in which she hit her R. parietal scalp and forehead and her right hip.   Initial impression is post-concussive syndrome due to mild confusion and headache and brief LOC  intercranial pathology/hemorrhage on the differential.    Plan:   pain control 650 tylenol  [place] Head Ct rule +1 for dangerous mechanism, CT non con to rule out epidiural/subdural  Xray right hip

## 2023-08-06 NOTE — ED PROVIDER NOTE - PATIENT PORTAL LINK FT
You can access the FollowMyHealth Patient Portal offered by [facility] by registering at the following website: [email or web address]. By joining [employer]’s FollowMyHealth portal, you will also be able to view your health information using other applications (apps) compatible with our system.

## 2023-08-06 NOTE — ED PROVIDER NOTE - ATTENDING CONTRIBUTION TO CARE
DR. DON, ATTENDING MD-  I have reviewed and discussed the medical student’s documentation and findings with the student. After personally examining the patient, my findings have been added to this documentation.    48 y/o female s/p mech fall with r hip pain.  She fell down approx 6 steps and had head strike, brief loc.  No amnesia.  No vision changes, no numbness tingling weakness.  No neck pain.  No deformity at r hip, rle full rom hip, pelvis stable, distally nv intact.  Cannot clear head d/t mechanism.  Obtain ct head xr hip/pelvis give pain med reassess.

## 2023-08-06 NOTE — ED ADULT NURSE NOTE - OBJECTIVE STATEMENT
47 year old female, received to spot 25A. Pt A&Ox4, ambulatory. Respirations equal and unlabored. Past medical history of HTN. Pt arrives to the ED s/p slip and fall, pt hit her head, states she lost consciousness for a split second, denies anticoagulation use. Pt also c/o right hip pain radiating to her right knee. Pt able to move all extremities, denies any numbness or tingling. Pt denies any other medical complaints. Neuro intact. PERRLA. Skin dry and intact. Medicated as per EMR orders. Pending XR/CT. No acute distress noted. Safety maintained, bed in lowest position, side rails raised, call bell in reach.

## 2023-10-06 ENCOUNTER — APPOINTMENT (OUTPATIENT)
Dept: CARDIOLOGY | Facility: CLINIC | Age: 48
End: 2023-10-06
Payer: COMMERCIAL

## 2023-10-06 ENCOUNTER — NON-APPOINTMENT (OUTPATIENT)
Age: 48
End: 2023-10-06

## 2023-10-06 VITALS
SYSTOLIC BLOOD PRESSURE: 127 MMHG | DIASTOLIC BLOOD PRESSURE: 84 MMHG | RESPIRATION RATE: 16 BRPM | HEIGHT: 64 IN | OXYGEN SATURATION: 97 % | BODY MASS INDEX: 37.22 KG/M2 | HEART RATE: 67 BPM | WEIGHT: 218 LBS

## 2023-10-06 DIAGNOSIS — R06.00 DYSPNEA, UNSPECIFIED: ICD-10-CM

## 2023-10-06 DIAGNOSIS — U07.1 COVID-19: ICD-10-CM

## 2023-10-06 DIAGNOSIS — R05.9 COUGH, UNSPECIFIED: ICD-10-CM

## 2023-10-06 DIAGNOSIS — M79.89 OTHER SPECIFIED SOFT TISSUE DISORDERS: ICD-10-CM

## 2023-10-06 DIAGNOSIS — R07.89 OTHER CHEST PAIN: ICD-10-CM

## 2023-10-06 DIAGNOSIS — Z87.898 PERSONAL HISTORY OF OTHER SPECIFIED CONDITIONS: ICD-10-CM

## 2023-10-06 DIAGNOSIS — I10 ESSENTIAL (PRIMARY) HYPERTENSION: ICD-10-CM

## 2023-10-06 DIAGNOSIS — R07.9 CHEST PAIN, UNSPECIFIED: ICD-10-CM

## 2023-10-06 PROCEDURE — 99204 OFFICE O/P NEW MOD 45 MIN: CPT

## 2023-10-06 PROCEDURE — 93000 ELECTROCARDIOGRAM COMPLETE: CPT

## 2023-10-06 RX ORDER — HYDROCHLOROTHIAZIDE 50 MG/1
50 TABLET ORAL DAILY
Refills: 0 | Status: ACTIVE | COMMUNITY

## 2023-10-06 RX ORDER — PREDNISONE 10 MG/1
10 TABLET ORAL
Qty: 30 | Refills: 0 | Status: DISCONTINUED | COMMUNITY
Start: 2022-04-18 | End: 2023-10-06

## 2023-10-06 RX ORDER — AMOXICILLIN 500 MG/1
500 CAPSULE ORAL
Qty: 21 | Refills: 0 | Status: DISCONTINUED | COMMUNITY
Start: 2022-04-22 | End: 2023-10-06

## 2023-10-06 RX ORDER — MECLIZINE HYDROCHLORIDE 25 MG/1
25 TABLET ORAL
Qty: 12 | Refills: 0 | Status: DISCONTINUED | COMMUNITY
Start: 2022-04-25 | End: 2023-10-06

## 2023-10-06 RX ORDER — BENZONATATE 200 MG/1
200 CAPSULE ORAL
Qty: 90 | Refills: 0 | Status: DISCONTINUED | COMMUNITY
Start: 2022-04-18 | End: 2023-10-06

## 2023-10-06 RX ORDER — PREDNISONE 20 MG/1
20 TABLET ORAL DAILY
Refills: 0 | Status: ACTIVE | COMMUNITY

## 2023-10-06 RX ORDER — POLYETHYLENE GLYCOL 3350, SODIUM CHLORIDE, SODIUM BICARBONATE AND POTASSIUM CHLORIDE WITH LEMON FLAVOR 420; 11.2; 5.72; 1.48 G/4L; G/4L; G/4L; G/4L
420 POWDER, FOR SOLUTION ORAL
Qty: 4000 | Refills: 0 | Status: DISCONTINUED | COMMUNITY
Start: 2022-02-15 | End: 2023-10-06

## 2023-10-06 RX ORDER — FLUTICASONE FUROATE AND VILANTEROL TRIFENATATE 200; 25 UG/1; UG/1
200-25 POWDER RESPIRATORY (INHALATION)
Qty: 60 | Refills: 0 | Status: DISCONTINUED | COMMUNITY
Start: 2022-04-18 | End: 2023-10-06

## 2023-10-06 RX ORDER — METFORMIN HYDROCHLORIDE 500 MG/1
500 TABLET, COATED ORAL
Qty: 60 | Refills: 0 | Status: DISCONTINUED | COMMUNITY
Start: 2022-01-09 | End: 2023-10-06

## 2023-10-06 RX ORDER — FAMOTIDINE 20 MG/1
20 TABLET, FILM COATED ORAL
Qty: 60 | Refills: 1 | Status: DISCONTINUED | COMMUNITY
Start: 2022-05-25 | End: 2023-10-06

## 2023-10-06 RX ORDER — OMEPRAZOLE 40 MG/1
40 CAPSULE, DELAYED RELEASE ORAL
Qty: 1 | Refills: 1 | Status: DISCONTINUED | COMMUNITY
Start: 2022-05-25 | End: 2023-10-06

## 2023-10-07 PROBLEM — U07.1 COVID-19 VIRUS INFECTION: Status: RESOLVED | Noted: 2023-10-07 | Resolved: 2023-10-07

## 2023-10-07 PROBLEM — R06.00 DYSPNEA: Status: ACTIVE | Noted: 2019-05-17

## 2023-10-07 PROBLEM — R07.89 CHEST DISCOMFORT: Status: ACTIVE | Noted: 2023-10-06

## 2023-10-07 PROBLEM — I10 HYPERTENSION: Status: ACTIVE | Noted: 2019-05-21

## 2024-01-02 NOTE — ED ADULT TRIAGE NOTE - NSTRIAGECARE_GEN_A_ER
Luis Centeno PA-C  [facility]   
EKG

## 2024-04-27 ENCOUNTER — EMERGENCY (EMERGENCY)
Facility: HOSPITAL | Age: 49
LOS: 1 days | Discharge: HOME | End: 2024-04-27
Attending: EMERGENCY MEDICINE | Admitting: EMERGENCY MEDICINE
Payer: COMMERCIAL

## 2024-04-27 VITALS
HEART RATE: 76 BPM | DIASTOLIC BLOOD PRESSURE: 95 MMHG | TEMPERATURE: 98 F | OXYGEN SATURATION: 100 % | RESPIRATION RATE: 16 BRPM | SYSTOLIC BLOOD PRESSURE: 137 MMHG

## 2024-04-27 VITALS
DIASTOLIC BLOOD PRESSURE: 90 MMHG | RESPIRATION RATE: 16 BRPM | HEART RATE: 65 BPM | SYSTOLIC BLOOD PRESSURE: 128 MMHG | TEMPERATURE: 98 F | OXYGEN SATURATION: 100 %

## 2024-04-27 DIAGNOSIS — O00.1 TUBAL PREGNANCY: Chronic | ICD-10-CM

## 2024-04-27 DIAGNOSIS — K37 UNSPECIFIED APPENDICITIS: Chronic | ICD-10-CM

## 2024-04-27 LAB
ALBUMIN SERPL ELPH-MCNC: 3.9 G/DL — SIGNIFICANT CHANGE UP (ref 3.3–5)
ALP SERPL-CCNC: 96 U/L — SIGNIFICANT CHANGE UP (ref 40–120)
ALT FLD-CCNC: 14 U/L — SIGNIFICANT CHANGE UP (ref 4–33)
ANION GAP SERPL CALC-SCNC: 15 MMOL/L — HIGH (ref 7–14)
APPEARANCE UR: CLEAR — SIGNIFICANT CHANGE UP
AST SERPL-CCNC: 23 U/L — SIGNIFICANT CHANGE UP (ref 4–32)
BASOPHILS # BLD AUTO: 0.02 K/UL — SIGNIFICANT CHANGE UP (ref 0–0.2)
BASOPHILS NFR BLD AUTO: 0.4 % — SIGNIFICANT CHANGE UP (ref 0–2)
BILIRUB SERPL-MCNC: <0.2 MG/DL — SIGNIFICANT CHANGE UP (ref 0.2–1.2)
BILIRUB UR-MCNC: NEGATIVE — SIGNIFICANT CHANGE UP
BUN SERPL-MCNC: 11 MG/DL — SIGNIFICANT CHANGE UP (ref 7–23)
CALCIUM SERPL-MCNC: 8.7 MG/DL — SIGNIFICANT CHANGE UP (ref 8.4–10.5)
CHLORIDE SERPL-SCNC: 103 MMOL/L — SIGNIFICANT CHANGE UP (ref 98–107)
CO2 SERPL-SCNC: 20 MMOL/L — LOW (ref 22–31)
COLOR SPEC: YELLOW — SIGNIFICANT CHANGE UP
CREAT SERPL-MCNC: 0.61 MG/DL — SIGNIFICANT CHANGE UP (ref 0.5–1.3)
DIFF PNL FLD: NEGATIVE — SIGNIFICANT CHANGE UP
EGFR: 110 ML/MIN/1.73M2 — SIGNIFICANT CHANGE UP
EOSINOPHIL # BLD AUTO: 0.15 K/UL — SIGNIFICANT CHANGE UP (ref 0–0.5)
EOSINOPHIL NFR BLD AUTO: 2.9 % — SIGNIFICANT CHANGE UP (ref 0–6)
GLUCOSE SERPL-MCNC: 95 MG/DL — SIGNIFICANT CHANGE UP (ref 70–99)
GLUCOSE UR QL: NEGATIVE MG/DL — SIGNIFICANT CHANGE UP
HCG SERPL-ACNC: <1 MIU/ML — SIGNIFICANT CHANGE UP
HCT VFR BLD CALC: 35.3 % — SIGNIFICANT CHANGE UP (ref 34.5–45)
HGB BLD-MCNC: 12.1 G/DL — SIGNIFICANT CHANGE UP (ref 11.5–15.5)
IANC: 2.52 K/UL — SIGNIFICANT CHANGE UP (ref 1.8–7.4)
IMM GRANULOCYTES NFR BLD AUTO: 0.2 % — SIGNIFICANT CHANGE UP (ref 0–0.9)
KETONES UR-MCNC: NEGATIVE MG/DL — SIGNIFICANT CHANGE UP
LEUKOCYTE ESTERASE UR-ACNC: NEGATIVE — SIGNIFICANT CHANGE UP
LYMPHOCYTES # BLD AUTO: 2.07 K/UL — SIGNIFICANT CHANGE UP (ref 1–3.3)
LYMPHOCYTES # BLD AUTO: 40.5 % — SIGNIFICANT CHANGE UP (ref 13–44)
MCHC RBC-ENTMCNC: 27.2 PG — SIGNIFICANT CHANGE UP (ref 27–34)
MCHC RBC-ENTMCNC: 34.3 GM/DL — SIGNIFICANT CHANGE UP (ref 32–36)
MCV RBC AUTO: 79.3 FL — LOW (ref 80–100)
MONOCYTES # BLD AUTO: 0.34 K/UL — SIGNIFICANT CHANGE UP (ref 0–0.9)
MONOCYTES NFR BLD AUTO: 6.7 % — SIGNIFICANT CHANGE UP (ref 2–14)
NEUTROPHILS # BLD AUTO: 2.52 K/UL — SIGNIFICANT CHANGE UP (ref 1.8–7.4)
NEUTROPHILS NFR BLD AUTO: 49.3 % — SIGNIFICANT CHANGE UP (ref 43–77)
NITRITE UR-MCNC: NEGATIVE — SIGNIFICANT CHANGE UP
NRBC # BLD: 0 /100 WBCS — SIGNIFICANT CHANGE UP (ref 0–0)
NRBC # FLD: 0 K/UL — SIGNIFICANT CHANGE UP (ref 0–0)
PH UR: 7.5 — SIGNIFICANT CHANGE UP (ref 5–8)
PLATELET # BLD AUTO: 262 K/UL — SIGNIFICANT CHANGE UP (ref 150–400)
POTASSIUM SERPL-MCNC: 3.9 MMOL/L — SIGNIFICANT CHANGE UP (ref 3.5–5.3)
POTASSIUM SERPL-SCNC: 3.9 MMOL/L — SIGNIFICANT CHANGE UP (ref 3.5–5.3)
PROT SERPL-MCNC: 6.9 G/DL — SIGNIFICANT CHANGE UP (ref 6–8.3)
PROT UR-MCNC: NEGATIVE MG/DL — SIGNIFICANT CHANGE UP
RBC # BLD: 4.45 M/UL — SIGNIFICANT CHANGE UP (ref 3.8–5.2)
RBC # FLD: 14.1 % — SIGNIFICANT CHANGE UP (ref 10.3–14.5)
SODIUM SERPL-SCNC: 138 MMOL/L — SIGNIFICANT CHANGE UP (ref 135–145)
SP GR SPEC: 1.01 — SIGNIFICANT CHANGE UP (ref 1–1.03)
UROBILINOGEN FLD QL: 0.2 MG/DL — SIGNIFICANT CHANGE UP (ref 0.2–1)
WBC # BLD: 5.11 K/UL — SIGNIFICANT CHANGE UP (ref 3.8–10.5)
WBC # FLD AUTO: 5.11 K/UL — SIGNIFICANT CHANGE UP (ref 3.8–10.5)

## 2024-04-27 PROCEDURE — 74177 CT ABD & PELVIS W/CONTRAST: CPT | Mod: 26,MC

## 2024-04-27 PROCEDURE — 99285 EMERGENCY DEPT VISIT HI MDM: CPT

## 2024-04-27 RX ORDER — ACETAMINOPHEN 500 MG
1000 TABLET ORAL ONCE
Refills: 0 | Status: COMPLETED | OUTPATIENT
Start: 2024-04-27 | End: 2024-04-27

## 2024-04-27 RX ORDER — SODIUM CHLORIDE 9 MG/ML
1000 INJECTION INTRAMUSCULAR; INTRAVENOUS; SUBCUTANEOUS ONCE
Refills: 0 | Status: COMPLETED | OUTPATIENT
Start: 2024-04-27 | End: 2024-04-27

## 2024-04-27 RX ORDER — IBUPROFEN 200 MG
400 TABLET ORAL ONCE
Refills: 0 | Status: COMPLETED | OUTPATIENT
Start: 2024-04-27 | End: 2024-04-27

## 2024-04-27 RX ORDER — LIDOCAINE 4 G/100G
1 CREAM TOPICAL ONCE
Refills: 0 | Status: COMPLETED | OUTPATIENT
Start: 2024-04-27 | End: 2024-04-27

## 2024-04-27 RX ORDER — LIDOCAINE 4 G/100G
1 CREAM TOPICAL
Qty: 1 | Refills: 0
Start: 2024-04-27 | End: 2024-04-30

## 2024-04-27 RX ORDER — METHOCARBAMOL 500 MG/1
2 TABLET, FILM COATED ORAL
Qty: 56 | Refills: 0
Start: 2024-04-27 | End: 2024-05-03

## 2024-04-27 RX ADMIN — SODIUM CHLORIDE 1000 MILLILITER(S): 9 INJECTION INTRAMUSCULAR; INTRAVENOUS; SUBCUTANEOUS at 05:32

## 2024-04-27 RX ADMIN — Medication 400 MILLIGRAM(S): at 09:51

## 2024-04-27 RX ADMIN — LIDOCAINE 1 PATCH: 4 CREAM TOPICAL at 05:32

## 2024-04-27 RX ADMIN — Medication 400 MILLIGRAM(S): at 05:32

## 2024-04-27 NOTE — ED ADULT TRIAGE NOTE - CHIEF COMPLAINT QUOTE
Pt c/o left sided flank pain radiating to back x 2 days. pt states this morning pain got worse. Pt states she had urgency and frequency 2 days ago but states it went away. denies any urinary symptoms at this time. No complaints of chest pain, headache, nausea, dizziness, vomiting  SOB, fever, chills verbalized..

## 2024-04-27 NOTE — ED PROVIDER NOTE - PROGRESS NOTE DETAILS
Anurag, PGY-3, EM: Pt signed out to me pending CT. Ct showing incidental finding of Anurag, PGY-3, EM: Pt signed out to me pending CT and reassessment. Ct showing incidental finding of fibroid vs. polyp, discussed need to be seen by her gynecologist. also showing a soft tissue density at the ileocecal junction, which it is recommended pt has a colonoscopy, pt verbalized understanding need for this, given GI #s. results discussed w/ the pt including labs/urine and CT. At this time pt would likely to be discharged, would rather take medications at home, she is requesting an additional dose of ibuprofen which is ordered. supportive care for likely muscle strain discussed.

## 2024-04-27 NOTE — ED ADULT NURSE REASSESSMENT NOTE - NS ED NURSE REASSESS COMMENT FT1
Patient received from night RN Ivet at 0820. Pt remains at baseline mental status AOx4, awake and appears to be resting comfortably in stretcher. No acute distress noted. Respirations even and unlabored on room air. VS as charted. Pending CTr. Safety maintained, stretcher locked in lowest position with siderails up x2, call bell and personal items within reach.

## 2024-04-27 NOTE — ED PROVIDER NOTE - CLINICAL SUMMARY MEDICAL DECISION MAKING FREE TEXT BOX
With hypertension on hydrochlorothiazide, on Mounjaro for weight loss presents emergency department with 1 day of left flank pain radiating across to the right side.  Denies any traumatic injuries.  Denies any numbness, ting, weakness to extremities.  States 2 to 2 days ago she had some urinary frequency but resolved spontaneously.  Denies fevers, vomiting. Took some Motrin at around 3 AM and had some subsequent nausea but no vomiting. Notes that pain is significantly worse with any movement of her trunk. When patient is lying on her back she appears comfortable but anytime she tries to move the leg turning on her side or sitting up she has severe pain.  She has no midline tenderness, does have some left CVA tenderness, abdomen is soft nontender.  Differential includes musculoskeletal back pain, radiculopathy, pyelonephritis, kidney stone. Plan for labs, UA and U culture, CT abdomen pelvis, pain control.

## 2024-04-27 NOTE — ED ADULT NURSE NOTE - OBJECTIVE STATEMENT
Patient received in 23, A&Ox3 ambulatory at baseline pmh: HTN presenting to ED c/o left sided flank pain radiating to the back. Pt states she had some urinary frequency 2 days ago but has resolved. Pt states pain worsens with movement. Denies CP, SOB, n/v/d, fever chills, abdominal pain, visual changes, hematuria, dysuria. Left 20g placed, labs drawn and sent. Pending imaging

## 2024-04-27 NOTE — ED PROVIDER NOTE - NSFOLLOWUPINSTRUCTIONS_ED_ALL_ED_FT
Please follow up with your primary care physician within 2-3 days.   Return to the ER for any new or concerning symptoms.   You may take 975-1000 mg acetaminophen every 6 hours as needed for pain.  You may take 600mg Ibuprofen (Advil) once every 8 hours as needed for pain. See medication label for warnings and use instructions.     You had a CT performed showing 2 incidental findings: an enlarged myomatous uterus. They recommend seeing you gynecologist for further follow up. There is also a small calcification associated with soft tissue density at the ileocecal junction. They are recommending you have a colonoscopy. We were initially concerned that your symptoms were musculoskeletal. We performed a CT scan that did not find any source of your pain on the CT scan, which is reassuring. Please take the medications sent to your pharmacy as prescribed.     Call your doctor or nurse advice line now or seek immediate medical care if:    You have new severe pain.  Your injured limb is cool or pale or changes colour.  You have tingling, weakness, or numbness in your injured limb.  You cannot move the injured area.  Watch closely for changes in your health, and be sure to contact your doctor or nurse advice line if:    You cannot put weight on a joint, or it feels unsteady when you walk.  Pain and swelling get worse or do not start to get better after 2 days of home treatment.

## 2024-04-27 NOTE — ED PROVIDER NOTE - PATIENT PORTAL LINK FT
You can access the FollowMyHealth Patient Portal offered by [facility] by registering at the following website: [email or web address]. By joining [employer]’s FollowMyHealth portal, you will also be able to view your health information using other applications (apps) compatible with our system.

## 2024-04-27 NOTE — ED PROVIDER NOTE - PHYSICAL EXAMINATION
GEN - NAD; well appearing; A+O x3   HEAD - NC/AT   ENT: Airway patent, mmm  PULMONARY - CTA b/l, symmetric breath sounds.   CARDIAC -s1s2, RRR, no M,G,R  ABDOMEN - +BS, ND, NT, soft, no guarding, no rebound, no masses   BACK - L CVA tenderness  EXTREMITIES - FROM  NEUROLOGIC - alert, speech clear  PSYCH -nl mood/affect, nl insight.

## 2024-04-27 NOTE — ED PROVIDER NOTE - NSFOLLOWUPCLINICS_GEN_ALL_ED_FT
Gastroenterology at [facility]  Gastroenterology  [address] [place] [address]  Phone: [phone]  Fax:     [facility] [place]  Gastroenterology  [address] [place] [address]  Phone: [phone]  Fax:     [facility]  Gastroenterology  [address] [place] [address]  Phone: [phone]  Fax: [phone]

## 2024-04-27 NOTE — ED ADULT NURSE NOTE - NSFALLUNIVINTERV_ED_ALL_ED
Bed/Stretcher in lowest position, wheels locked, appropriate side rails in place/Call bell, personal items and telephone in reach/Instruct patient to call for assistance before getting out of bed/chair/stretcher/Non-slip footwear applied when patient is off stretcher/[place] to call system/Physically safe environment - no spills, clutter or unnecessary equipment/Purposeful proactive rounding/Room/bathroom lighting operational, light cord in reach

## 2024-04-29 LAB
CULTURE RESULTS: SIGNIFICANT CHANGE UP
SPECIMEN SOURCE: SIGNIFICANT CHANGE UP

## 2024-05-25 ENCOUNTER — EMERGENCY (EMERGENCY)
Facility: HOSPITAL | Age: 49
LOS: 1 days | Discharge: HOME | End: 2024-05-25
Attending: EMERGENCY MEDICINE | Admitting: EMERGENCY MEDICINE
Payer: COMMERCIAL

## 2024-05-25 VITALS
RESPIRATION RATE: 16 BRPM | HEART RATE: 77 BPM | OXYGEN SATURATION: 100 % | TEMPERATURE: 98 F | SYSTOLIC BLOOD PRESSURE: 135 MMHG | DIASTOLIC BLOOD PRESSURE: 85 MMHG

## 2024-05-25 DIAGNOSIS — K37 UNSPECIFIED APPENDICITIS: Chronic | ICD-10-CM

## 2024-05-25 DIAGNOSIS — O00.1 TUBAL PREGNANCY: Chronic | ICD-10-CM

## 2024-05-25 LAB
ADD ON TEST-SPECIMEN IN LAB: SIGNIFICANT CHANGE UP
ALBUMIN SERPL ELPH-MCNC: 4.5 G/DL — SIGNIFICANT CHANGE UP (ref 3.3–5)
ALP SERPL-CCNC: 111 U/L — SIGNIFICANT CHANGE UP (ref 40–120)
ALT FLD-CCNC: 13 U/L — SIGNIFICANT CHANGE UP (ref 4–33)
ANION GAP SERPL CALC-SCNC: 20 MMOL/L — HIGH (ref 7–14)
APTT BLD: 30 SEC — SIGNIFICANT CHANGE UP (ref 24.5–35.6)
AST SERPL-CCNC: 24 U/L — SIGNIFICANT CHANGE UP (ref 4–32)
BASOPHILS # BLD AUTO: 0 K/UL — SIGNIFICANT CHANGE UP (ref 0–0.2)
BASOPHILS NFR BLD AUTO: 0 % — SIGNIFICANT CHANGE UP (ref 0–2)
BILIRUB SERPL-MCNC: 0.2 MG/DL — SIGNIFICANT CHANGE UP (ref 0.2–1.2)
BLOOD GAS VENOUS COMPREHENSIVE RESULT: SIGNIFICANT CHANGE UP
BUN SERPL-MCNC: 10 MG/DL — SIGNIFICANT CHANGE UP (ref 7–23)
CALCIUM SERPL-MCNC: 9.3 MG/DL — SIGNIFICANT CHANGE UP (ref 8.4–10.5)
CHLORIDE SERPL-SCNC: 97 MMOL/L — LOW (ref 98–107)
CO2 SERPL-SCNC: 20 MMOL/L — LOW (ref 22–31)
CREAT SERPL-MCNC: 0.72 MG/DL — SIGNIFICANT CHANGE UP (ref 0.5–1.3)
EGFR: 103 ML/MIN/1.73M2 — SIGNIFICANT CHANGE UP
EOSINOPHIL # BLD AUTO: 0.54 K/UL — HIGH (ref 0–0.5)
EOSINOPHIL NFR BLD AUTO: 5.4 % — SIGNIFICANT CHANGE UP (ref 0–6)
FLUAV AG NPH QL: SIGNIFICANT CHANGE UP
FLUBV AG NPH QL: SIGNIFICANT CHANGE UP
GLUCOSE SERPL-MCNC: 203 MG/DL — HIGH (ref 70–99)
HCT VFR BLD CALC: 39.6 % — SIGNIFICANT CHANGE UP (ref 34.5–45)
HGB BLD-MCNC: 13.5 G/DL — SIGNIFICANT CHANGE UP (ref 11.5–15.5)
IANC: 4.03 K/UL — SIGNIFICANT CHANGE UP (ref 1.8–7.4)
INR BLD: 1.02 RATIO — SIGNIFICANT CHANGE UP (ref 0.85–1.18)
LYMPHOCYTES # BLD AUTO: 1.99 K/UL — SIGNIFICANT CHANGE UP (ref 1–3.3)
LYMPHOCYTES # BLD AUTO: 19.8 % — SIGNIFICANT CHANGE UP (ref 13–44)
MCHC RBC-ENTMCNC: 26.3 PG — LOW (ref 27–34)
MCHC RBC-ENTMCNC: 34.1 GM/DL — SIGNIFICANT CHANGE UP (ref 32–36)
MCV RBC AUTO: 77.2 FL — LOW (ref 80–100)
MONOCYTES # BLD AUTO: 0.09 K/UL — SIGNIFICANT CHANGE UP (ref 0–0.9)
MONOCYTES NFR BLD AUTO: 0.9 % — LOW (ref 2–14)
NEUTROPHILS # BLD AUTO: 4.44 K/UL — SIGNIFICANT CHANGE UP (ref 1.8–7.4)
NEUTROPHILS NFR BLD AUTO: 44.2 % — SIGNIFICANT CHANGE UP (ref 43–77)
PLATELET # BLD AUTO: 340 K/UL — SIGNIFICANT CHANGE UP (ref 150–400)
POTASSIUM SERPL-MCNC: 3 MMOL/L — LOW (ref 3.5–5.3)
POTASSIUM SERPL-SCNC: 3 MMOL/L — LOW (ref 3.5–5.3)
PROT SERPL-MCNC: 7.8 G/DL — SIGNIFICANT CHANGE UP (ref 6–8.3)
PROTHROM AB SERPL-ACNC: 11.4 SEC — SIGNIFICANT CHANGE UP (ref 9.5–13)
RBC # BLD: 5.13 M/UL — SIGNIFICANT CHANGE UP (ref 3.8–5.2)
RBC # FLD: 13.6 % — SIGNIFICANT CHANGE UP (ref 10.3–14.5)
RSV RNA NPH QL NAA+NON-PROBE: SIGNIFICANT CHANGE UP
SARS-COV-2 RNA SPEC QL NAA+PROBE: SIGNIFICANT CHANGE UP
SODIUM SERPL-SCNC: 137 MMOL/L — SIGNIFICANT CHANGE UP (ref 135–145)
TROPONIN T, HIGH SENSITIVITY RESULT: <6 NG/L — SIGNIFICANT CHANGE UP
WBC # BLD: 10.05 K/UL — SIGNIFICANT CHANGE UP (ref 3.8–10.5)
WBC # FLD AUTO: 10.05 K/UL — SIGNIFICANT CHANGE UP (ref 3.8–10.5)

## 2024-05-25 PROCEDURE — 99285 EMERGENCY DEPT VISIT HI MDM: CPT

## 2024-05-25 PROCEDURE — 71250 CT THORAX DX C-: CPT | Mod: 26,MC

## 2024-05-25 PROCEDURE — 0042T: CPT | Mod: MC

## 2024-05-25 PROCEDURE — 70450 CT HEAD/BRAIN W/O DYE: CPT | Mod: 26,MC,59

## 2024-05-25 PROCEDURE — 71045 X-RAY EXAM CHEST 1 VIEW: CPT | Mod: 26

## 2024-05-25 PROCEDURE — 70496 CT ANGIOGRAPHY HEAD: CPT | Mod: 26,MC

## 2024-05-25 PROCEDURE — 70498 CT ANGIOGRAPHY NECK: CPT | Mod: 26,MC

## 2024-05-25 RX ORDER — SODIUM CHLORIDE 9 MG/ML
1000 INJECTION INTRAMUSCULAR; INTRAVENOUS; SUBCUTANEOUS ONCE
Refills: 0 | Status: COMPLETED | OUTPATIENT
Start: 2024-05-25 | End: 2024-05-25

## 2024-05-25 RX ORDER — POTASSIUM CHLORIDE 20 MEQ
40 PACKET (EA) ORAL ONCE
Refills: 0 | Status: COMPLETED | OUTPATIENT
Start: 2024-05-25 | End: 2024-05-25

## 2024-05-25 RX ADMIN — SODIUM CHLORIDE 1000 MILLILITER(S): 9 INJECTION INTRAMUSCULAR; INTRAVENOUS; SUBCUTANEOUS at 20:45

## 2024-05-25 RX ADMIN — Medication 1 MILLIGRAM(S): at 19:40

## 2024-05-25 RX ADMIN — Medication 40 MILLIEQUIVALENT(S): at 20:43

## 2024-05-25 NOTE — ED ADULT NURSE NOTE - NSFALLUNIVINTERV_ED_ALL_ED
Bed/Stretcher in lowest position, wheels locked, appropriate side rails in place/Call bell, personal items and telephone in reach/Instruct patient to call for assistance before getting out of bed/chair/stretcher/Non-slip footwear applied when patient is off stretcher/[place] to call system/Physically safe environment - no spills, clutter or unnecessary equipment/Purposeful proactive rounding/Room/bathroom lighting operational, light cord in reach

## 2024-05-25 NOTE — ED PROVIDER NOTE - NSFOLLOWUPINSTRUCTIONS_ED_ALL_ED_FT
You came to the Emergency Room because of concern for cough, vomiting, dizziness.     Your CT scan shows that you may have atypical pneumonia infection.     Please take the prescribed antibiotic Azithromycin 250 mg once daily for the next four days.     You may take Tylenol 1000 mg every 8 hours (no more than 4000 mg per 24 hours) as needed for fever/pain.   You may take Ibuprofen 400 mg every 6 hours as needed for fever/pain.      Please follow up with your Primary Medical Doctor within the next 7 days to monitor your symptoms.     Please come back to the Emergency Room if your symptoms get worse.

## 2024-05-25 NOTE — ED PROVIDER NOTE - CLINICAL SUMMARY MEDICAL DECISION MAKING FREE TEXT BOX
low suspicion for acute cva. head ct and cta wnl. Will obtain cbc to rule out anemia. Will obtain CMP to rule out electrolyte abnormalities, liver failure or renal failure. Will obtain a chest xray to rule out PNA, PTX or pleural effusion ct chest for pna. fluids. ? mounjaro side effect given patient recently increased dose. will get trop and ekg to rule out ACS. dispo pending reassessment

## 2024-05-25 NOTE — ED PROVIDER NOTE - NSDCPRINTRESULTS_ED_ALL_ED
Patient requests all Lab, Cardiology, and Radiology Results on their Discharge Instructions Pounds Preamble Statement (Weight Entered In Details Tab): Reported Weight in pounds:

## 2024-05-25 NOTE — ED ADULT TRIAGE NOTE - PATIENT ON (OXYGEN DELIVERY METHOD)
room air 19 yr old male to ED A&Ox3 presents s/p fall down 4 stairs. Pt now with rt hip pain. 19 yr old male to ED A&Ox3 presents s/p fall down 4 stairs. Pt now with rt hip pain. Pt reports hitting head, no LOC.  Neuro intact.  +pain to sacral area and rt hip noted with palp.  Increased pain with movement.  No midline tenderness noted.  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  Pt took 3 advil prior to coming in.  Reports pain in hip 9/10.  Sister at bedside.  Awaiting x-rays.

## 2024-05-25 NOTE — STROKE CODE NOTE - DISPOSITION
Code stroke cancelled due to patient presentation consistent with N,V dizziness in the setting of recent manjauro dose change 1 day ago, pt feels like passing out, appears hypoglycemic and requesting D5. Neurologic exam non focal.

## 2024-05-25 NOTE — ED ADULT TRIAGE NOTE - CHIEF COMPLAINT QUOTE
pt wheeled into triage, last known well 1600 today, since then has been experiencing blurry vision, n/v, generalized weakness, bilateral upper and lower extremity numnbess/tingling. Dr. Sheehan in triage evaluating patient, code stoke activated. past medical history: pre diabetes, HTN

## 2024-05-25 NOTE — ED PROVIDER NOTE - OBJECTIVE STATEMENT
48 year old female pmh htn dm presents with general weakness and vomiting. patient recently started mounjaro and increased dose.  no abd pain. no fever. no chest pain. also complains of cough. no focal deficits.

## 2024-05-25 NOTE — ED ADULT NURSE NOTE - OBJECTIVE STATEMENT
Pt presents to CT scan as a code stroke, pt endorses being sick for the past week, states she has had blurry vision, generalized weakness, + headache, numbness to both upper and lower extremities, pt breathing even and unlabored, denies chest pain, afebrile, neuro and ED team at bedside, IVL placed to Rt AC 20g saline lock, labs collected and sent, medicated as ordered, will continue to monitor.

## 2024-05-25 NOTE — ED PROVIDER NOTE - ATTENDING CONTRIBUTION TO CARE
low suspicion for acute cva. head ct and cta wnl. Will obtain cbc to rule out anemia. Will obtain CMP to rule out electrolyte abnormalities, liver failure or renal failure. Will obtain a chest xray to rule out PNA, PTX or pleural effusion ct chest for pna. fluids. ? mounjaro side effect given patient recently increased dose. will get trop and ekg to rule out ACS. dispo pending reassessment. will get cta to rule out pna if cxr is wnl.

## 2024-05-25 NOTE — ED PROVIDER NOTE - PROGRESS NOTE DETAILS
[facility] PGY3: patient reassessed. reports that she is feeling better now. Still feeling tired. CT finding of atypical PNA. Will discharge with azithromycin.

## 2024-05-25 NOTE — ED ADULT NURSE REASSESSMENT NOTE - NS ED NURSE REASSESS COMMENT FT1
report received from Day RN Lucian, patient laying in semi fowlers position on the stretcher. patient alert and oriented times four. patient denies shortness of breath, chest pain, nausea, vomiting, chill, fever. Patient normal sinus on the monitor. Respirations equal and adequate. Patients IV patent, no signs of infiltration. Safety measures in place, call bell within reach. Patient stable upon leaving the room. Dyspagia screening passed. Neuro intact. Patient states that weakness overall is generalized and has been lasted a few weeks

## 2024-05-25 NOTE — ED ADULT TRIAGE NOTE - NS ED TRIAGE AVPU SCALE
Alert-The patient is alert, awake and responds to voice. The patient is oriented to time, place, and person. The triage nurse is able to obtain subjective information. no suicidal ideation.

## 2024-05-25 NOTE — ED PROVIDER NOTE - PATIENT PORTAL LINK FT
You can access the FollowMyHealth Patient Portal offered by [facility] by registering at the following website: [email or web address]. By joining [employer]’s FollowMyHealth portal, you will also be able to view your health information using other applications (apps) compatible with our system.

## 2024-05-26 VITALS
DIASTOLIC BLOOD PRESSURE: 84 MMHG | RESPIRATION RATE: 16 BRPM | OXYGEN SATURATION: 100 % | HEART RATE: 83 BPM | TEMPERATURE: 99 F | SYSTOLIC BLOOD PRESSURE: 130 MMHG

## 2024-05-26 RX ORDER — AZITHROMYCIN 500 MG/1
1 TABLET, FILM COATED ORAL
Qty: 4 | Refills: 0
Start: 2024-05-26 | End: 2024-05-29

## 2024-05-26 RX ORDER — AZITHROMYCIN 500 MG/1
500 TABLET, FILM COATED ORAL ONCE
Refills: 0 | Status: COMPLETED | OUTPATIENT
Start: 2024-05-26 | End: 2024-05-26

## 2024-05-26 RX ADMIN — AZITHROMYCIN 255 MILLIGRAM(S): 500 TABLET, FILM COATED ORAL at 02:50

## 2024-05-26 NOTE — ED ADULT NURSE REASSESSMENT NOTE - NS ED NURSE REASSESS COMMENT FT1
report received from HAILEE Samson. patient laying in semi fowlers position on the stretcher. patient alert and oriented times four. patient denies shortness of breath, chest pain, nausea, vomiting, chill, fever. Patient normal sinus on the monitor. Respirations equal and adequate. Patients IV patent, no signs of infiltration. Safety measures in place, call bell within reach. Patient stable upon leaving the room.

## 2024-05-26 NOTE — ED ADULT NURSE REASSESSMENT NOTE - NS ED NURSE REASSESS COMMENT FT1
Break RN: Patient is AOx4 and in no signs of acute distress. Respirations even and unlabored, chest rise symmetrica; b/l. Patient pending discharge. Patient appears comfortable on stretcher. Comfort measures maintained. Bed in lowest position. Safety Maintained.

## 2024-05-28 ENCOUNTER — APPOINTMENT (OUTPATIENT)
Dept: GASTROENTEROLOGY | Facility: CLINIC | Age: 49
End: 2024-05-28
Payer: COMMERCIAL

## 2024-05-28 VITALS
HEART RATE: 80 BPM | BODY MASS INDEX: 34.66 KG/M2 | TEMPERATURE: 97.6 F | SYSTOLIC BLOOD PRESSURE: 130 MMHG | OXYGEN SATURATION: 98 % | DIASTOLIC BLOOD PRESSURE: 85 MMHG | RESPIRATION RATE: 16 BRPM | WEIGHT: 203 LBS | HEIGHT: 64 IN

## 2024-05-28 DIAGNOSIS — R11.0 NAUSEA: ICD-10-CM

## 2024-05-28 DIAGNOSIS — Z82.49 FAMILY HISTORY OF ISCHEMIC HEART DISEASE AND OTHER DISEASES OF THE CIRCULATORY SYSTEM: ICD-10-CM

## 2024-05-28 DIAGNOSIS — R20.2 ANESTHESIA OF SKIN: ICD-10-CM

## 2024-05-28 DIAGNOSIS — R20.0 ANESTHESIA OF SKIN: ICD-10-CM

## 2024-05-28 DIAGNOSIS — Z86.79 PERSONAL HISTORY OF OTHER DISEASES OF THE CIRCULATORY SYSTEM: ICD-10-CM

## 2024-05-28 DIAGNOSIS — Z12.11 ENCOUNTER FOR SCREENING FOR MALIGNANT NEOPLASM OF COLON: ICD-10-CM

## 2024-05-28 PROCEDURE — 99204 OFFICE O/P NEW MOD 45 MIN: CPT

## 2024-05-28 RX ORDER — SODIUM SULFATE, POTASSIUM SULFATE AND MAGNESIUM SULFATE 1.6; 3.13; 17.5 G/177ML; G/177ML; G/177ML
17.5-3.13-1.6 SOLUTION ORAL
Qty: 2 | Refills: 0 | Status: ACTIVE | COMMUNITY
Start: 2024-05-28 | End: 1900-01-01

## 2024-05-28 NOTE — PHYSICAL EXAM
[Alert] : alert [Normal Voice/Communication] : normal voice/communication [Healthy Appearing] : healthy appearing [No Acute Distress] : no acute distress [Sclera] : the sclera and conjunctiva were normal [Hearing Threshold Finger Rub Not [place]] : hearing was normal [Normal Lips/Gums] : the lips and gums were normal [Oropharynx] : the oropharynx was normal [Normal Appearance] : the appearance of the neck was normal [No Neck Mass] : no neck mass was observed [No Respiratory Distress] : no respiratory distress [No Acc Muscle Use] : no accessory muscle use [Respiration, Rhythm And Depth] : normal respiratory rhythm and effort [Auscultation Breath Sounds / Voice Sounds] : lungs were clear to auscultation bilaterally [Heart Rate And Rhythm] : heart rate was normal and rhythm regular [Normal S1, S2] : normal S1 and S2 [Murmurs] : no murmurs [None] : no edema [Bowel Sounds] : normal bowel sounds [Abdomen Tenderness] : non-tender [No Masses] : no abdominal mass palpated [Abdomen Soft] : soft [Cervical Lymph Nodes Enlarged Posterior Bilaterally] : no posterior cervical lymphadenopathy [Supraclavicular Lymph Nodes Enlarged Bilaterally] : no supraclavicular lymphadenopathy [Cervical Lymph Nodes Enlarged Anterior Bilaterally] : no anterior cervical lymphadenopathy [Abnormal Walk] : normal gait [No Clubbing, Cyanosis] : no clubbing or cyanosis of the fingernails [Normal Color / Pigmentation] : normal skin color and pigmentation [] : no rash [No Focal Deficits] : no focal deficits [Oriented To Time, Place, And Person] : oriented to person, place, and time [de-identified] : Vital signs are stable,

## 2024-05-28 NOTE — REVIEW OF SYSTEMS
[As Noted in HPI] : as noted in HPI [Bloating (gassiness)] : bloating [Negative] : Heme/Lymph [FreeTextEntry7] : Nausea

## 2024-05-28 NOTE — HISTORY OF PRESENT ILLNESS
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  She was accompanied by her  is also a healthcare provider She was started on Mounjaro 5 mg for the past few months for weight loss and was doing well, the dose was increased to 7.5 mg a week ago.  2 days later she felt nauseous, tired and weak and inability to walk She was also complaining of tingling and numbness in her both arms.  She was seen at an emergency room Sunday for extreme tiredness and nausea She was given intravenous fluids and Zofran.  She was found to be hypokalemic and potassium supplementation was given CT scan of the abdomen and pelvis in April2024 showed mild increase in the stool burden, calcified soft tissue at the ileocecal junction likely fecalith and under distended stomach Since discharge she is feeling a bit better , feeling weak and tired and nauseous She denied any cardiac or pulmonary disease in the past.  No history of NSAID use, alcohol or smoking No family history of GI malignancy, IBD or peptic ulcer disease She never had any EGD or colonoscopy in the past

## 2024-05-28 NOTE — ASSESSMENT
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  I advised her plenty of oral fluids, ice chips and increase p.o. intake Also advised her to discuss with her primary care physician to adjust the dosing of  Mounjaro Upper endoscopy and CRC screening with colonoscopy recommended after recovery

## 2025-02-06 NOTE — ED PROVIDER NOTE - CONSTITUTIONAL DEVELOPMENT, MLM
--------------  ADMISSION REVIEW     Payor: TIA  Subscriber #:  [id number]  Authorization Number: [id number]    Admit date: 2/5/25  Admit time: 10:24 PM     Patient Seen in: [facility] Emergency Department    Chief Complaint   Patient presents with    Eval-D     alcohol withdrawl, last drink this AM   hx of a-fib, SVT        Stated Complaint: alcohol withdrawl, last drink this AM       This is a 42-year-old female with history of alcohol abuse, pancreatitis, diabetes mellitus, presents to the emergency room with chief complaint of alcohol withdrawal.  States that she been having upper abdominal pain the last 24 hours as well, states it feels similar to previous pancreatitis.  Denies vomiting.  Denies diarrhea.  Denies melena or hematochezia.  Denies any fevers or chills.  Denies chest pain or shortness of breath.  HX BREAST CA  DM      ED Triage Vitals [02/05/25 1741]   /83   Pulse (!) 145   Resp 24   Temp 97.8 °F (36.6 °C)   Temp src Temporal   SpO2 96 %   O2 Device None (Room air)       Current Vitals:   Vital Signs  BP: 114/78  Pulse: 91  Resp: 17  Temp: 97.8 °F (36.6 °C)  Temp src: Temporal  MAP (mmHg): 89    Oxygen Therapy  SpO2: 91 %  O2 Device: None (Room air)        Physical Exam  GENERAL: Patient is awake, alert, tremulous  HEENT: Pupils equal round reactive to light, extraocular muscles are intact, there is no scleral icterus.  Mucous membranes are dry, oropharynx is clear, uvula midline.    Scalp is atraumatic.  NECK: Neck is supple, there is no nuchal rigidity.    HEART: Tachycardic rate regular rhythm   LUNGS: Clear to auscultation bilaterally.  No Rales, no rhonchi, no wheezing, no stridor.  ABDOMEN: Soft, nondistended, diffuse upper abdominal tender,  no rebound, no rigidity, no guarding.no pulsatile masses. No CVA tenderness  EXTREMITIES: No peripheral edema, no calf tenderness  NEUROLOGIC EXAM: Tongue midline, no facial drooping, no ptosis, moves all 4 extremities daily equally      ED  Course     Labs Reviewed   COMP METABOLIC PANEL (14) - Abnormal; Notable for the following components:       Result Value    Glucose 155 (*)     Sodium 135 (*)     Chloride 92 (*)     CO2 16.0 (*)     Anion Gap 27 (*)     BUN 7 (*)     Alkaline Phosphatase 108 (*)     Total Protein 8.6 (*)     Albumin 5.2 (*)     All other components within normal limits   CBC WITH DIFFERENTIAL WITH PLATELET - Abnormal; Notable for the following components:    RBC 5.61 (*)     HGB 18.1 (*)     HCT 51.6 (*)     All other components within normal limits   ETHYL ALCOHOL - Abnormal; Notable for the following components:    Ethyl Alcohol 120 (*)     All other components within normal limits   ACETAMINOPHEN (TYLENOL), S - Abnormal; Notable for the following components:    Acetaminophen <2.0 (*)     All other components within normal limits   SALICYLATE, SERUM - Abnormal; Notable for the following components:    Salicylate <3.0 (*)     All other components within normal limits   MAGNESIUM - Abnormal; Notable for the following components:    Magnesium 1.4 (*)     All other components within normal limits   ABG PANEL W ELECT AND LACTATE - Abnormal; Notable for the following components:    ABG pCO2 30 (*)     ABG pO2 70 (*)     ABG Base Excess -2.5 (*)     Arterial Blood Gas O2Hb 91.9 (*)     Sodium Blood Gas 130 (*)     Lactic Acid (Blood Gas) 4.4 (*)     All other components within normal limits   LACTIC ACID, PLASMA - Abnormal; Notable for the following components:    Lactic Acid 4.3 (*)     All other components within normal limits   LIPASE - Normal   TSH W REFLEX TO FREE T4 - Normal   ACETONE - Normal   URINALYSIS WITH CULTURE REFLEX   DRUG SCREEN 8 W/OUT CONFIRMATION, URINE   URINALYSIS WITH CULTURE REFLEX   RAINBOW DRAW LAVENDER   RAINBOW DRAW LIGHT GREEN   RAINBOW DRAW BLUE   RAINBOW DRAW GOLD     EKG    Rate, intervals and axes as noted on EKG Report.  Rate: 134  Rhythm: Sinus Rhythm  Reading: Sinus tachycardia, no ST elevation.           Radiographic images  I personally reviewed the radiographs and my individual interpretation shows CT abdomen, no free air I also reviewed the official reports that showed CT no acute process, findings of chronic pancreatitis with persistent intra pancreatic pseudocyst  re    Medications Provided: Ativan, multivitamin, IV normal saline, Dilaudid    Course of Events during Emergency Room Visit include patient IV established, placed on cardiac monitor and pulse ox.  CIWA scale performed, patient was given IV Ativan per protocol.  Patient did receive IV fluids and multivitamin.  Chemistry sodium 135 potassium 3.7 bicarb 16 BUN 7 creatinine 0.9 anion gap 27.  Glucose 155.  CT abdomen/pelvis performed.  Discussed patient with pulmonary/critical care who evaluated the patient in ER as well as hospitalist.      Patient will be admitted to the ICU for further evaluation and treatment.  Abdomen is remained soft nonsurgical.  Vital signs are stable.      Disposition and Plan     Clinical Impression:  1. Alcohol withdrawal syndrome with complication (HCC)    2. Alcoholic ketoacidosis    3. Hypomagnesemia    4. Alcohol-induced chronic pancreatitis (HCC)        H & P    Chief Complaint: N/V      []Expand by Default  # ETOH withdrawal   - CIWA  - replace lyte  - psych liasion      # AGMA likely due to euglycemia DKA?  - insulin drip  - monitor BMP  - IVF     # acute on chronic pancreatitis   - NPO  - bowel rest  - CT AP pending   - IVF  - antiemetic  - pain control      # Intractable nausea and vomiting   - due to above      # chronic splenic vein occlusion   # essential HTN   - BB IV PRN                                   MEDICATIONS ADMINISTERED IN LAST 1 DAY:  dextrose in lactated ringers 5% infusion       Date Action Dose Route User    Discharged on 2/6/2025 2/5/2025 2121 New Bag (none) Intravenous Will Morrison, RN          folic acid (Folvite) 1 mg in sodium chloride 0.9% 50 mL IVPB       Date Action Dose Route User     Discharged on 2/6/2025 2/5/2025 1846 New Bag 1 mg Intravenous Sundeep Sanchez RN          folic acid (Folvite) 1 mg in sodium chloride 0.9% 50 mL IVPB       Date Action Dose Route User    Discharged on 2/6/2025 2/6/2025 0849 New Bag 1 mg Intravenous Lupillo Pineda RN          HYDROmorphone (Dilaudid) 1 MG/ML injection 0.5 mg       Date Action Dose Route User    Discharged on 2/6/2025 2/5/2025 2114 Given 0.5 mg Intravenous Will Morrison RN          HYDROmorphone (Dilaudid) 1 MG/ML injection 0.2 mg       Date Action Dose Route User    Discharged on 2/6/2025 2/5/2025 2337 Given 0.2 mg Intravenous Kassie Yepez RN          HYDROmorphone (Dilaudid) 1 MG/ML injection 0.4 mg       Date Action Dose Route User    Discharged on 2/6/2025 2/6/2025 0112 Given 0.4 mg Intravenous Kassie Yepez RN          insulin aspart (NovoLOG) 100 Units/mL FlexPen 2-10 Units       Date Action Dose Route User    Discharged on 2/6/2025 2/6/2025 0113 Given 6 Units Subcutaneous (Left Upper Arm) Kassie Yepez RN          insulin aspart (NovoLOG) 100 Units/mL FlexPen 1-68 Units       Date Action Dose Route User    Discharged on 2/6/2025 2/6/2025 0851 Given 3 Units Subcutaneous (Right Lower Abdomen) Lupillo Pineda RN          insulin degludec (Tresiba) 100 units/mL flextouch 15 Units       Date Action Dose Route User    Discharged on 2/6/2025 2/6/2025 0112 Given 15 Units Subcutaneous (Right Upper Arm) Kassie Yepez RN          iopamidol 76% (ISOVUE-370) injection for power injector       Date Action Dose Route User    Discharged on 2/6/2025 2/5/2025 2018 Given 80 mL Intravenous Molina, Crystal          lactated ringers IV bolus 1,000 mL       Date Action Dose Route User    Discharged on 2/6/2025 2/5/2025 2209 New Bag 1,000 mL Intravenous Will Morrison RN          LORazepam (Ativan) 2 mg/mL injection 2 mg       Date Action Dose Route User    Discharged on 2/6/2025 2/5/2025 1934 Given 2 mg  Intravenous Will Morrison RN    2/5/2025 1845 Given 2 mg Intravenous Sundeep Sanchez RN          magnesium sulfate in sterile water for injection 2 g/50mL IVPB premix 2 g       Date Action Dose Route User    Discharged on 2/6/2025 2/6/2025 0525 New Bag 2 g Intravenous Kassie Yepez RN          magnesium sulfate in dextrose 5% 1 g/100mL infusion premix 1 g       Date Action Dose Route User    Discharged on 2/6/2025 2/5/2025 2036 New Bag 1 g Intravenous Will Morrison RN          ondansetron (Zofran) 4 MG/2ML injection 4 mg       Date Action Dose Route User    Discharged on 2/6/2025 2/5/2025 2337 Given 4 mg Intravenous Kassie Yepez RN          sodium chloride 0.9 % IV bolus 1,000 mL       Date Action Dose Route User    Discharged on 2/6/2025 2/5/2025 1845 New Bag 1,000 mL Intravenous Sundeep Sanchez RN          multivitamin (Tab-A-Toni/Beta Carotene) tab 1 tablet       Date Action Dose Route User    Discharged on 2/6/2025 2/5/2025 1845 Given 1 tablet Oral Sundeep Sanchez RN          multivitamin (Tab-A-Toni/Beta Carotene) tab 1 tablet       Date Action Dose Route User    Discharged on 2/6/2025 2/6/2025 0849 Given 1 tablet Oral Lupillo Pineda RN          thiamine 100 mg/mL injection 100 mg       Date Action Dose Route User    Discharged on 2/6/2025 2/5/2025 1845 Given 100 mg Intravenous Sundeep Sanchez RN          thiamine 100 mg/mL injection 500 mg       Date Action Dose Route User    Discharged on 2/6/2025 2/6/2025 0317 Given 500 mg Intravenous Kassie Yepez RN            Vitals (last day) before discharge       Date/Time Temp Pulse Resp BP SpO2 Weight O2 Device O2 Flow Rate (L/min) [facility]    02/06/25 1100 -- 82 12 124/86 98 % -- -- --     02/06/25 1000 -- 75 14 116/80 92 % -- None (Room air) --     02/06/25 0900 -- 71 13 117/81 94 % -- -- --     02/06/25 0800 98.1 °F (36.7 °C) 70 13 115/69 93 % -- None (Room air) --     02/06/25 0700 -- 67 13  110/65 93 % -- -- -- CT    02/06/25 0600 -- 62 18 111/72 91 % -- -- -- CT    02/06/25 0500 -- 71 15 101/71 92 % -- -- -- CT    02/06/25 0400 -- 69 17 105/64 93 % -- -- -- CT    02/06/25 0300 98.3 °F (36.8 °C) 64 15 107/64 91 % -- None (Room air) -- CT    02/06/25 0230 -- 85 16 95/64 91 % -- -- -- CT    02/06/25 0200 -- 64 15 106/68 94 % -- -- -- CT    02/06/25 0130 -- 64 11 111/73 92 % -- -- -- CT    02/06/25 0100 -- 71 14 105/68 92 % -- -- -- CT    02/06/25 0030 -- 67 12 107/71 92 % -- -- -- CT    02/06/25 0000 -- 76 12 103/73 94 % -- -- -- CT    02/05/25 2330 -- 92 14 110/72 94 % -- -- -- CT    02/05/25 2308 -- 78 13 105/74 93 % -- -- -- CT    02/05/25 2300 -- 79 13 105/74 95 % -- -- -- CT    02/05/25 2226 98.2 °F (36.8 °C) -- -- -- -- -- -- -- EG    02/05/25 2226 -- 83 20 107/66 95 % 137 lb 9.1 oz (62.4 kg) None (Room air) -- CT    02/05/25 2200 -- 91 17 114/78 91 % -- None (Room air) -- [facility]    02/05/25 2130 -- 90 21 123/85 90 % -- None (Room air) -- [facility]    02/05/25 2030 -- 104 19 116/93 97 % -- None (Room air) -- [facility]    02/05/25 2000 -- 108 20 120/89 94 % -- None (Room air) -- [facility]    02/05/25 1930 -- 108 -- 126/95 -- -- None (Room air) -- [facility]    02/05/25 1900 -- 122 23 132/94 97 % -- None (Room air) --     02/05/25 1837 -- 124 28 138/100 100 % -- None (Room air) --     02/05/25 1741 97.8 °F (36.6 °C) 145 24 106/83 96 % 145 lb (65.8 kg) None (Room air) -- AP          CIWA Scores (last 2 days) before discharge       Date/Time CIWA-Ar Total Who    02/06/25 1000 0 BK    02/06/25 0800 0 BK    02/06/25 0600 1 CT    02/06/25 0400 2 CT    02/06/25 0200 0 CT    02/06/25 0000 1 CT    02/05/25 2230 5 CT    02/05/25 2030 2 [facility]    02/05/25 1930 10 [facility]ELO    02/05/25 1837 13                  well developed

## 2025-06-09 ENCOUNTER — TRANSCRIPTION ENCOUNTER (OUTPATIENT)
Age: 50
End: 2025-06-09

## 2025-06-09 ENCOUNTER — EMERGENCY (EMERGENCY)
Facility: HOSPITAL | Age: 50
LOS: 1 days | End: 2025-06-09
Attending: STUDENT IN AN ORGANIZED HEALTH CARE EDUCATION/TRAINING PROGRAM | Admitting: STUDENT IN AN ORGANIZED HEALTH CARE EDUCATION/TRAINING PROGRAM
Payer: COMMERCIAL

## 2025-06-09 VITALS
RESPIRATION RATE: 18 BRPM | DIASTOLIC BLOOD PRESSURE: 76 MMHG | OXYGEN SATURATION: 98 % | SYSTOLIC BLOOD PRESSURE: 130 MMHG | HEART RATE: 67 BPM | TEMPERATURE: 98 F

## 2025-06-09 VITALS
RESPIRATION RATE: 18 BRPM | OXYGEN SATURATION: 98 % | DIASTOLIC BLOOD PRESSURE: 65 MMHG | HEIGHT: 65 IN | HEART RATE: 85 BPM | SYSTOLIC BLOOD PRESSURE: 136 MMHG | TEMPERATURE: 98 F | WEIGHT: 184.09 LBS

## 2025-06-09 DIAGNOSIS — O00.1 TUBAL PREGNANCY: Chronic | ICD-10-CM

## 2025-06-09 DIAGNOSIS — K37 UNSPECIFIED APPENDICITIS: Chronic | ICD-10-CM

## 2025-06-09 PROCEDURE — 70450 CT HEAD/BRAIN W/O DYE: CPT | Mod: 26

## 2025-06-09 PROCEDURE — 72125 CT NECK SPINE W/O DYE: CPT | Mod: 26

## 2025-06-09 PROCEDURE — 99284 EMERGENCY DEPT VISIT MOD MDM: CPT

## 2025-06-09 RX ORDER — DICLOFENAC SODIUM 10 MG/G
2.25 GEL TOPICAL
Qty: 1 | Refills: 0
Start: 2025-06-09 | End: 2025-06-13

## 2025-06-09 RX ORDER — ACETAMINOPHEN 500 MG/5ML
650 LIQUID (ML) ORAL ONCE
Refills: 0 | Status: COMPLETED | OUTPATIENT
Start: 2025-06-09 | End: 2025-06-09

## 2025-06-09 RX ORDER — LIDOCAINE HYDROCHLORIDE 20 MG/ML
1 JELLY TOPICAL ONCE
Refills: 0 | Status: COMPLETED | OUTPATIENT
Start: 2025-06-09 | End: 2025-06-09

## 2025-06-09 RX ORDER — ACETAMINOPHEN 500 MG/5ML
2 LIQUID (ML) ORAL
Qty: 40 | Refills: 0
Start: 2025-06-09 | End: 2025-06-13

## 2025-06-09 RX ORDER — METHOCARBAMOL 500 MG/1
1500 TABLET, FILM COATED ORAL ONCE
Refills: 0 | Status: COMPLETED | OUTPATIENT
Start: 2025-06-09 | End: 2025-06-09

## 2025-06-09 RX ADMIN — LIDOCAINE HYDROCHLORIDE 1 PATCH: 20 JELLY TOPICAL at 14:45

## 2025-06-09 RX ADMIN — METHOCARBAMOL 1500 MILLIGRAM(S): 500 TABLET, FILM COATED ORAL at 14:45

## 2025-06-09 RX ADMIN — Medication 650 MILLIGRAM(S): at 14:44
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
## 2025-06-09 NOTE — ED PROVIDER NOTE - PROGRESS NOTE DETAILS
Patient on re-evaluation with headache and neck pain improved. Ambulatory with steady gait and fully neuro intact. CT head and C-spine revealing no acute pathology. Patient counseled on home care instructions and return precautions. Safe and stable for discharge home.
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
## 2025-06-09 NOTE — ED PROVIDER NOTE - NSFOLLOWUPINSTRUCTIONS_ED_ALL_ED_FT
You have been seen in the ER for concussive symptoms.   Please continue to take acetaminophen 650mg and ibuprofen 400mg every 6 hours as needed for pain control. Avoid bright lights, loud noises. Return to ER if you develop worsening vomiting, visual changes, worsening headache despite pain control at home. Follow-up with your primary doctor.

## 2025-06-09 NOTE — ED PROVIDER NOTE - PHYSICAL EXAMINATION
Physical Exam  GEN: Alert and oriented x 3, in no acute distress, speaking full clear sentences  HEENT: NC/AT, PERRL, EOMI, normal oropharynx  NECK: Supple, nontender, FROM  CV: RRR, no m/r/g  PULM: CTA bilat, no wheezing/rales/rhonchi  ABD: Soft, nontender, nondistended. No organomegaly  EXTR: FROM to all extremities, nontender, no edema  SKIN: Warm, dry, no rash  NEURO: AOx3, speaking full clear sentences, CN II-XII intact. b/l upper and lower extremity strength and sensation intact and equal, no pronator drift, finger-to-nose intact and equal, ambulatory with steady gait.

## 2025-06-09 NOTE — ED ADULT NURSE NOTE - NSFALLUNIVINTERV_ED_ALL_ED
Bed/Stretcher in lowest position, wheels locked, appropriate side rails in place/Call bell, personal items and telephone in reach/Instruct patient to call for assistance before getting out of bed/chair/stretcher/Non-slip footwear applied when patient is off stretcher/[place] to call system/Physically safe environment - no spills, clutter or unnecessary equipment/Purposeful proactive rounding/Room/bathroom lighting operational, light cord in reach
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
## 2025-06-09 NOTE — ED PROVIDER NOTE - PATIENT PORTAL LINK FT
You can access the FollowMyHealth Patient Portal offered by [facility] by registering at the following website: [email or web address]. By joining [employer]’[facility] FollowMyHealth portal, you will also be able to view your health information using other applications (apps) compatible with our system.
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
## 2025-09-17 ENCOUNTER — EMERGENCY (EMERGENCY)
Facility: HOSPITAL | Age: 50
LOS: 1 days | End: 2025-09-17
Attending: EMERGENCY MEDICINE | Admitting: EMERGENCY MEDICINE
Payer: COMMERCIAL

## 2025-09-17 VITALS
WEIGHT: 190.92 LBS | HEART RATE: 64 BPM | DIASTOLIC BLOOD PRESSURE: 85 MMHG | TEMPERATURE: 98 F | SYSTOLIC BLOOD PRESSURE: 128 MMHG | RESPIRATION RATE: 18 BRPM

## 2025-09-17 DIAGNOSIS — K37 UNSPECIFIED APPENDICITIS: Chronic | ICD-10-CM

## 2025-09-17 DIAGNOSIS — O00.1 TUBAL PREGNANCY: Chronic | ICD-10-CM

## 2025-09-17 LAB
ADD ON TEST-SPECIMEN IN LAB: SIGNIFICANT CHANGE UP
ALBUMIN SERPL ELPH-MCNC: 3.9 G/DL — SIGNIFICANT CHANGE UP (ref 3.3–5)
ALP SERPL-CCNC: 77 U/L — SIGNIFICANT CHANGE UP (ref 40–120)
ALT FLD-CCNC: 13 U/L — SIGNIFICANT CHANGE UP (ref 4–33)
ANION GAP SERPL CALC-SCNC: 12 MMOL/L — SIGNIFICANT CHANGE UP (ref 7–14)
APPEARANCE UR: CLEAR — SIGNIFICANT CHANGE UP
AST SERPL-CCNC: 25 U/L — SIGNIFICANT CHANGE UP (ref 4–32)
BASOPHILS # BLD AUTO: 0.04 K/UL — SIGNIFICANT CHANGE UP (ref 0–0.2)
BASOPHILS NFR BLD AUTO: 0.8 % — SIGNIFICANT CHANGE UP (ref 0–2)
BILIRUB SERPL-MCNC: 0.2 MG/DL — SIGNIFICANT CHANGE UP (ref 0.2–1.2)
BILIRUB UR-MCNC: NEGATIVE — SIGNIFICANT CHANGE UP
BUN SERPL-MCNC: 11 MG/DL — SIGNIFICANT CHANGE UP (ref 7–23)
CALCIUM SERPL-MCNC: 9.2 MG/DL — SIGNIFICANT CHANGE UP (ref 8.4–10.5)
CHLORIDE SERPL-SCNC: 101 MMOL/L — SIGNIFICANT CHANGE UP (ref 98–107)
CO2 SERPL-SCNC: 23 MMOL/L — SIGNIFICANT CHANGE UP (ref 22–31)
COLOR SPEC: YELLOW — SIGNIFICANT CHANGE UP
CREAT SERPL-MCNC: 0.87 MG/DL — SIGNIFICANT CHANGE UP (ref 0.5–1.3)
DIFF PNL FLD: NEGATIVE — SIGNIFICANT CHANGE UP
EGFR: 82 ML/MIN/1.73M2 — SIGNIFICANT CHANGE UP
EGFR: 82 ML/MIN/1.73M2 — SIGNIFICANT CHANGE UP
EOSINOPHIL # BLD AUTO: 0.12 K/UL — SIGNIFICANT CHANGE UP (ref 0–0.5)
EOSINOPHIL NFR BLD AUTO: 2.3 % — SIGNIFICANT CHANGE UP (ref 0–6)
GLUCOSE SERPL-MCNC: 96 MG/DL — SIGNIFICANT CHANGE UP (ref 70–99)
GLUCOSE UR QL: NEGATIVE MG/DL — SIGNIFICANT CHANGE UP
HCT VFR BLD CALC: 36.1 % — SIGNIFICANT CHANGE UP (ref 34.5–45)
HGB BLD-MCNC: 12.2 G/DL — SIGNIFICANT CHANGE UP (ref 11.5–15.5)
IMM GRANULOCYTES # BLD AUTO: 0.01 K/UL — SIGNIFICANT CHANGE UP (ref 0–0.07)
IMM GRANULOCYTES NFR BLD AUTO: 0.2 % — SIGNIFICANT CHANGE UP (ref 0–0.9)
KETONES UR QL: NEGATIVE MG/DL — SIGNIFICANT CHANGE UP
LEUKOCYTE ESTERASE UR-ACNC: NEGATIVE — SIGNIFICANT CHANGE UP
LYMPHOCYTES # BLD AUTO: 2.95 K/UL — SIGNIFICANT CHANGE UP (ref 1–3.3)
LYMPHOCYTES NFR BLD AUTO: 56.7 % — HIGH (ref 13–44)
MCHC RBC-ENTMCNC: 26.8 PG — LOW (ref 27–34)
MCHC RBC-ENTMCNC: 33.8 G/DL — SIGNIFICANT CHANGE UP (ref 32–36)
MCV RBC AUTO: 79.3 FL — LOW (ref 80–100)
MONOCYTES # BLD AUTO: 0.34 K/UL — SIGNIFICANT CHANGE UP (ref 0–0.9)
MONOCYTES NFR BLD AUTO: 6.5 % — SIGNIFICANT CHANGE UP (ref 2–14)
NEUTROPHILS # BLD AUTO: 1.74 K/UL — LOW (ref 1.8–7.4)
NEUTROPHILS NFR BLD AUTO: 33.5 % — LOW (ref 43–77)
NITRITE UR-MCNC: NEGATIVE — SIGNIFICANT CHANGE UP
NRBC # BLD AUTO: 0 K/UL — SIGNIFICANT CHANGE UP (ref 0–0)
NRBC # FLD: 0 K/UL — SIGNIFICANT CHANGE UP (ref 0–0)
NRBC BLD AUTO-RTO: 0 /100 WBCS — SIGNIFICANT CHANGE UP (ref 0–0)
PH UR: 7.5 — SIGNIFICANT CHANGE UP (ref 5–8)
PLATELET # BLD AUTO: 249 K/UL — SIGNIFICANT CHANGE UP (ref 150–400)
PMV BLD: 10.3 FL — SIGNIFICANT CHANGE UP (ref 7–13)
POTASSIUM SERPL-MCNC: 4 MMOL/L — SIGNIFICANT CHANGE UP (ref 3.5–5.3)
POTASSIUM SERPL-SCNC: 4 MMOL/L — SIGNIFICANT CHANGE UP (ref 3.5–5.3)
PROT SERPL-MCNC: 7.2 G/DL — SIGNIFICANT CHANGE UP (ref 6–8.3)
PROT UR-MCNC: NEGATIVE MG/DL — SIGNIFICANT CHANGE UP
RBC # BLD: 4.55 M/UL — SIGNIFICANT CHANGE UP (ref 3.8–5.2)
RBC # FLD: 13.8 % — SIGNIFICANT CHANGE UP (ref 10.3–14.5)
SODIUM SERPL-SCNC: 136 MMOL/L — SIGNIFICANT CHANGE UP (ref 135–145)
SP GR SPEC: 1.01 — SIGNIFICANT CHANGE UP (ref 1–1.03)
UROBILINOGEN FLD QL: 0.2 MG/DL — SIGNIFICANT CHANGE UP (ref 0.2–1)
WBC # BLD: 5.2 K/UL — SIGNIFICANT CHANGE UP (ref 3.8–10.5)
WBC # FLD AUTO: 5.2 K/UL — SIGNIFICANT CHANGE UP (ref 3.8–10.5)

## 2025-09-17 PROCEDURE — 99284 EMERGENCY DEPT VISIT MOD MDM: CPT

## 2025-09-17 RX ORDER — LIDOCAINE HYDROCHLORIDE 20 MG/ML
1 JELLY TOPICAL ONCE
Refills: 0 | Status: COMPLETED | OUTPATIENT
Start: 2025-09-17 | End: 2025-09-17

## 2025-09-17 RX ORDER — IBUPROFEN 200 MG
600 TABLET ORAL ONCE
Refills: 0 | Status: COMPLETED | OUTPATIENT
Start: 2025-09-17 | End: 2025-09-17

## 2025-09-17 RX ADMIN — LIDOCAINE HYDROCHLORIDE 1 PATCH: 20 JELLY TOPICAL at 23:40

## 2025-09-17 RX ADMIN — Medication 600 MILLIGRAM(S): at 23:10

## 2025-09-18 VITALS
SYSTOLIC BLOOD PRESSURE: 148 MMHG | DIASTOLIC BLOOD PRESSURE: 97 MMHG | RESPIRATION RATE: 18 BRPM | HEART RATE: 53 BPM | TEMPERATURE: 98 F | OXYGEN SATURATION: 100 %

## 2025-09-18 PROCEDURE — 74176 CT ABD & PELVIS W/O CONTRAST: CPT | Mod: 26
